# Patient Record
Sex: FEMALE | Race: WHITE | NOT HISPANIC OR LATINO | Employment: OTHER | URBAN - METROPOLITAN AREA
[De-identification: names, ages, dates, MRNs, and addresses within clinical notes are randomized per-mention and may not be internally consistent; named-entity substitution may affect disease eponyms.]

---

## 2021-04-08 ENCOUNTER — OFFICE VISIT (OUTPATIENT)
Dept: RHEUMATOLOGY | Facility: CLINIC | Age: 71
End: 2021-04-08
Payer: MEDICARE

## 2021-04-08 VITALS
DIASTOLIC BLOOD PRESSURE: 77 MMHG | BODY MASS INDEX: 23.96 KG/M2 | SYSTOLIC BLOOD PRESSURE: 126 MMHG | OXYGEN SATURATION: 98 % | HEIGHT: 67 IN | WEIGHT: 152.63 LBS | HEART RATE: 102 BPM | TEMPERATURE: 98 F | RESPIRATION RATE: 16 BRPM

## 2021-04-08 DIAGNOSIS — E03.9 HYPOTHYROIDISM, UNSPECIFIED TYPE: ICD-10-CM

## 2021-04-08 DIAGNOSIS — Z13.89 SCREENING FOR RHEUMATIC DISORDER: ICD-10-CM

## 2021-04-08 DIAGNOSIS — Z11.59 ENCOUNTER FOR HEPATITIS C SCREENING TEST FOR LOW RISK PATIENT: ICD-10-CM

## 2021-04-08 DIAGNOSIS — M19.90 OSTEOARTHRITIS, UNSPECIFIED OSTEOARTHRITIS TYPE, UNSPECIFIED SITE: ICD-10-CM

## 2021-04-08 DIAGNOSIS — Z11.1 SCREENING-PULMONARY TB: ICD-10-CM

## 2021-04-08 DIAGNOSIS — M25.50 POLYARTHRALGIA: Primary | ICD-10-CM

## 2021-04-08 PROCEDURE — 99204 OFFICE O/P NEW MOD 45 MIN: CPT | Mod: S$GLB,,, | Performed by: INTERNAL MEDICINE

## 2021-04-08 PROCEDURE — 99204 PR OFFICE/OUTPT VISIT, NEW, LEVL IV, 45-59 MIN: ICD-10-PCS | Mod: S$GLB,,, | Performed by: INTERNAL MEDICINE

## 2021-04-08 RX ORDER — CELECOXIB 200 MG/1
200 CAPSULE ORAL DAILY PRN
Qty: 30 CAPSULE | Refills: 3 | Status: SHIPPED | OUTPATIENT
Start: 2021-04-08 | End: 2021-07-21 | Stop reason: SDUPTHER

## 2021-04-08 RX ORDER — LEVOTHYROXINE SODIUM 125 UG/1
TABLET ORAL
COMMUNITY
End: 2021-09-01

## 2021-04-08 RX ORDER — CELECOXIB 200 MG/1
CAPSULE ORAL
COMMUNITY
Start: 2021-03-18 | End: 2021-04-08 | Stop reason: SDUPTHER

## 2021-05-12 ENCOUNTER — OFFICE VISIT (OUTPATIENT)
Dept: RHEUMATOLOGY | Facility: CLINIC | Age: 71
End: 2021-05-12
Payer: MEDICARE

## 2021-05-12 VITALS
SYSTOLIC BLOOD PRESSURE: 132 MMHG | WEIGHT: 146 LBS | HEIGHT: 67 IN | OXYGEN SATURATION: 99 % | HEART RATE: 90 BPM | TEMPERATURE: 98 F | DIASTOLIC BLOOD PRESSURE: 62 MMHG | BODY MASS INDEX: 22.91 KG/M2

## 2021-05-12 DIAGNOSIS — M15.8 OTHER OSTEOARTHRITIS INVOLVING MULTIPLE JOINTS: ICD-10-CM

## 2021-05-12 DIAGNOSIS — J84.9 INTERSTITIAL LUNG DISEASE: ICD-10-CM

## 2021-05-12 DIAGNOSIS — M67.912 ROTATOR CUFF DYSFUNCTION, LEFT: ICD-10-CM

## 2021-05-12 DIAGNOSIS — M05.79 RHEUMATOID ARTHRITIS INVOLVING MULTIPLE SITES WITH POSITIVE RHEUMATOID FACTOR: Primary | ICD-10-CM

## 2021-05-12 DIAGNOSIS — Z79.899 HIGH RISK MEDICATION USE: ICD-10-CM

## 2021-05-12 DIAGNOSIS — Z13.89 SCREENING FOR RHEUMATIC DISORDER: ICD-10-CM

## 2021-05-12 PROCEDURE — 99215 PR OFFICE/OUTPT VISIT, EST, LEVL V, 40-54 MIN: ICD-10-PCS | Mod: S$GLB,,, | Performed by: INTERNAL MEDICINE

## 2021-05-12 PROCEDURE — 99215 OFFICE O/P EST HI 40 MIN: CPT | Mod: S$GLB,,, | Performed by: INTERNAL MEDICINE

## 2021-05-12 RX ORDER — IPRATROPIUM BROMIDE AND ALBUTEROL SULFATE 2.5; .5 MG/3ML; MG/3ML
3 SOLUTION RESPIRATORY (INHALATION)
Status: CANCELLED | OUTPATIENT
Start: 2021-05-12

## 2021-05-12 RX ORDER — ACETAMINOPHEN 325 MG/1
650 TABLET ORAL
Status: CANCELLED | OUTPATIENT
Start: 2021-05-12

## 2021-05-12 RX ORDER — HEPARIN 100 UNIT/ML
500 SYRINGE INTRAVENOUS
Status: CANCELLED | OUTPATIENT
Start: 2021-05-12

## 2021-05-12 RX ORDER — PREDNISONE 10 MG/1
10 TABLET ORAL DAILY PRN
Qty: 30 TABLET | Refills: 1 | Status: SHIPPED | OUTPATIENT
Start: 2021-05-12 | End: 2021-07-05 | Stop reason: SDUPTHER

## 2021-05-12 RX ORDER — DIPHENHYDRAMINE HYDROCHLORIDE 50 MG/ML
25 INJECTION INTRAMUSCULAR; INTRAVENOUS
Status: CANCELLED | OUTPATIENT
Start: 2021-05-12

## 2021-05-12 RX ORDER — METHOTREXATE 2.5 MG/1
7.5 TABLET ORAL
Qty: 12 TABLET | Refills: 6 | Status: SHIPPED | OUTPATIENT
Start: 2021-05-12 | End: 2021-09-01 | Stop reason: SDUPTHER

## 2021-05-12 RX ORDER — SODIUM CHLORIDE 0.9 % (FLUSH) 0.9 %
10 SYRINGE (ML) INJECTION
Status: CANCELLED | OUTPATIENT
Start: 2021-05-12

## 2021-05-12 RX ORDER — FOLIC ACID 1 MG/1
1 TABLET ORAL DAILY
Qty: 30 TABLET | Refills: 6 | Status: SHIPPED | OUTPATIENT
Start: 2021-05-12 | End: 2021-09-01 | Stop reason: SDUPTHER

## 2021-05-12 RX ORDER — METHYLPREDNISOLONE SOD SUCC 125 MG
40 VIAL (EA) INJECTION
Status: CANCELLED | OUTPATIENT
Start: 2021-05-12

## 2021-05-12 RX ORDER — EPINEPHRINE 1 MG/ML
0.3 INJECTION, SOLUTION, CONCENTRATE INTRAVENOUS
Status: CANCELLED | OUTPATIENT
Start: 2021-05-12

## 2021-07-05 DIAGNOSIS — M05.79 RHEUMATOID ARTHRITIS INVOLVING MULTIPLE SITES WITH POSITIVE RHEUMATOID FACTOR: ICD-10-CM

## 2021-07-06 RX ORDER — PREDNISONE 10 MG/1
10 TABLET ORAL DAILY PRN
Qty: 30 TABLET | Refills: 1 | Status: SHIPPED | OUTPATIENT
Start: 2021-07-06 | End: 2021-09-01 | Stop reason: SDUPTHER

## 2021-07-07 LAB
ALBUMIN SERPL BCP-MCNC: 4.2 G/DL (ref 3.4–5)
ALBUMIN/GLOBULIN RATIO: 1.05 RATIO (ref 1.1–1.8)
ALP SERPL-CCNC: 131 U/L (ref 46–116)
ALT SERPL W P-5'-P-CCNC: 21 U/L (ref 12–78)
ANION GAP SERPL CALC-SCNC: 12 MMOL/L (ref 3–11)
APPEARANCE, UA: CLEAR
AST SERPL-CCNC: 14 U/L (ref 15–37)
BACTERIA SPEC CULT: ABNORMAL /HPF
BASOPHILS NFR BLD: 0.5 % (ref 0–3)
BILIRUB SERPL-MCNC: 0.5 MG/DL (ref 0–1)
BILIRUB UR QL STRIP: ABNORMAL MG/DL
BUN SERPL-MCNC: 14 MG/DL (ref 7–18)
BUN/CREAT SERPL: 18.18 RATIO (ref 7–18)
CALCIUM SERPL-MCNC: 9 MG/DL (ref 8.8–10.5)
CHLORIDE SERPL-SCNC: 102 MMOL/L (ref 100–108)
CO2 SERPL-SCNC: 28 MMOL/L (ref 21–32)
COLOR UR: ABNORMAL
CREAT SERPL-MCNC: 0.77 MG/DL (ref 0.55–1.02)
CREATINE KINASE, SERUM: 25 U/L (ref 26–308)
CRP QUANTITATIVE: < 0.2 MG/DL (ref 0–0.9)
EOSINOPHIL NFR BLD: 0.7 % (ref 1–3)
ERYTHROCYTE [DISTWIDTH] IN BLOOD BY AUTOMATED COUNT: 17.2 % (ref 12.5–18)
ERYTHROCYTE [SEDIMENTATION RATE] IN BLOOD: 21 MM/HR (ref 0–20)
GFR ESTIMATION: > 60
GLOBULIN: 4 G/DL (ref 2.3–3.5)
GLUCOSE (UA): NORMAL MG/DL
GLUCOSE SERPL-MCNC: 96 MG/DL (ref 70–110)
HBV SURFACE AB SER-ACNC: NONREACTIVE M[IU]/ML
HBV SURFACE AG SERPL QL IA: NONREACTIVE
HCT VFR BLD AUTO: 42.8 % (ref 37–47)
HGB BLD-MCNC: 13.6 G/DL (ref 12–16)
HGB UR QL STRIP: 10 /UL
KETONES UR QL STRIP: ABNORMAL MG/DL
LEUKOCYTE ESTERASE UR QL STRIP: 25 /UL
LYMPHOCYTES NFR BLD: 12.5 % (ref 25–40)
MCH RBC QN AUTO: 29.4 PG (ref 27–31.2)
MCHC RBC AUTO-ENTMCNC: 31.8 G/DL (ref 31.8–35.4)
MCV RBC AUTO: 92.4 FL (ref 80–97)
MONOCYTES NFR BLD: 5.1 % (ref 1–15)
NEUTROPHILS # BLD AUTO: 9.29 10*3/UL (ref 1.8–7.7)
NEUTROPHILS NFR BLD: 80.6 % (ref 37–80)
NITRITE UR QL STRIP: NEGATIVE
NUCLEATED RED BLOOD CELLS: 0 %
PH UR STRIP: 5 PH (ref 5–9)
PLATELETS: 381 10*3/UL (ref 142–424)
POTASSIUM SERPL-SCNC: 3.6 MMOL/L (ref 3.6–5.2)
PROT SERPL-MCNC: 8.2 G/DL (ref 6.4–8.2)
PROT UR QL STRIP: ABNORMAL MG/DL
RBC # BLD AUTO: 4.63 10*6/UL (ref 4.2–5.4)
RBC #/AREA URNS HPF: ABNORMAL /HPF (ref 0–2)
SERVICE COMMENT 03: ABNORMAL
SODIUM BLD-SCNC: 142 MMOL/L (ref 135–145)
SP GR UR STRIP: 1.02 (ref 1–1.03)
SPECIMEN COLLECTION METHOD, URINE: ABNORMAL
SQUAMOUS EPITHELIAL, UA: ABNORMAL /LPF
UROBILINOGEN UR STRIP-ACNC: 4 MG/DL
WBC # BLD: 11.5 10*3/UL (ref 4.6–10.2)
WBC #/AREA URNS HPF: ABNORMAL /HPF (ref 0–5)

## 2021-07-09 LAB — HEPATITIS B CORE AB TOTAL: NEGATIVE

## 2021-07-21 ENCOUNTER — OFFICE VISIT (OUTPATIENT)
Dept: RHEUMATOLOGY | Facility: CLINIC | Age: 71
End: 2021-07-21
Payer: MEDICARE

## 2021-07-21 VITALS
BODY MASS INDEX: 23.86 KG/M2 | TEMPERATURE: 98 F | HEIGHT: 67 IN | RESPIRATION RATE: 16 BRPM | OXYGEN SATURATION: 98 % | WEIGHT: 152 LBS

## 2021-07-21 DIAGNOSIS — M15.8 OTHER OSTEOARTHRITIS INVOLVING MULTIPLE JOINTS: ICD-10-CM

## 2021-07-21 DIAGNOSIS — J84.9 INTERSTITIAL LUNG DISEASE: ICD-10-CM

## 2021-07-21 DIAGNOSIS — M05.79 RHEUMATOID ARTHRITIS INVOLVING MULTIPLE SITES WITH POSITIVE RHEUMATOID FACTOR: Primary | ICD-10-CM

## 2021-07-21 DIAGNOSIS — M25.50 POLYARTHRALGIA: ICD-10-CM

## 2021-07-21 DIAGNOSIS — Z79.899 HIGH RISK MEDICATION USE: ICD-10-CM

## 2021-07-21 DIAGNOSIS — M67.912 ROTATOR CUFF DYSFUNCTION, LEFT: ICD-10-CM

## 2021-07-21 PROCEDURE — 99215 OFFICE O/P EST HI 40 MIN: CPT | Mod: S$GLB,,, | Performed by: INTERNAL MEDICINE

## 2021-07-21 PROCEDURE — 99215 PR OFFICE/OUTPT VISIT, EST, LEVL V, 40-54 MIN: ICD-10-PCS | Mod: S$GLB,,, | Performed by: INTERNAL MEDICINE

## 2021-07-21 RX ORDER — CEFDINIR 300 MG/1
300 CAPSULE ORAL 2 TIMES DAILY
COMMUNITY
End: 2021-09-01

## 2021-07-21 RX ORDER — ALBUTEROL SULFATE 5 MG/ML
2.5 SOLUTION RESPIRATORY (INHALATION) EVERY 6 HOURS PRN
COMMUNITY
End: 2022-11-21

## 2021-07-21 RX ORDER — CELECOXIB 200 MG/1
200 CAPSULE ORAL DAILY PRN
Qty: 30 CAPSULE | Refills: 3 | Status: SHIPPED | OUTPATIENT
Start: 2021-07-21 | End: 2022-01-07

## 2021-08-16 LAB
ALBUMIN SERPL BCP-MCNC: 3.6 G/DL (ref 3.4–5)
ALBUMIN/GLOBULIN RATIO: 1.09 RATIO (ref 1.1–1.8)
ALP SERPL-CCNC: 80 U/L (ref 46–116)
ALT SERPL W P-5'-P-CCNC: 22 U/L (ref 12–78)
ANION GAP SERPL CALC-SCNC: 6 MMOL/L (ref 3–11)
AST SERPL-CCNC: 17 U/L (ref 15–37)
BASOPHILS NFR BLD: 1 % (ref 0–3)
BILIRUB SERPL-MCNC: 0.5 MG/DL (ref 0–1)
BUN SERPL-MCNC: 16 MG/DL (ref 7–18)
BUN/CREAT SERPL: 22.22 RATIO (ref 7–18)
CALCIUM SERPL-MCNC: 8.8 MG/DL (ref 8.8–10.5)
CHLORIDE SERPL-SCNC: 105 MMOL/L (ref 100–108)
CO2 SERPL-SCNC: 30 MMOL/L (ref 21–32)
CREAT SERPL-MCNC: 0.72 MG/DL (ref 0.55–1.02)
CREATINE KINASE, SERUM: 24 U/L (ref 26–308)
CRP QUANTITATIVE: 0.5 MG/DL (ref 0–0.9)
EOSINOPHIL NFR BLD: 3.8 % (ref 1–3)
ERYTHROCYTE [DISTWIDTH] IN BLOOD BY AUTOMATED COUNT: 16.4 % (ref 12.5–18)
ERYTHROCYTE [SEDIMENTATION RATE] IN BLOOD: 45 MM/HR (ref 0–20)
GFR ESTIMATION: > 60
GLOBULIN: 3.3 G/DL (ref 2.3–3.5)
GLUCOSE SERPL-MCNC: 91 MG/DL (ref 70–110)
HCT VFR BLD AUTO: 39 % (ref 37–47)
HGB BLD-MCNC: 12 G/DL (ref 12–16)
HYPOCHROMIA BLD QL SMEAR: NORMAL
LYMPHOCYTES NFR BLD: 32.9 % (ref 25–40)
MCH RBC QN AUTO: 29.5 PG (ref 27–31.2)
MCHC RBC AUTO-ENTMCNC: 30.8 G/DL (ref 31.8–35.4)
MCV RBC AUTO: 95.8 FL (ref 80–97)
MONOCYTES NFR BLD: 5.8 % (ref 1–15)
NEUTROPHILS # BLD AUTO: 4.86 10*3/UL (ref 1.8–7.7)
NEUTROPHILS NFR BLD: 55.9 % (ref 37–80)
NUCLEATED RED BLOOD CELLS: 0 %
PLATELETS: 322 10*3/UL (ref 142–424)
POTASSIUM SERPL-SCNC: 4.3 MMOL/L (ref 3.6–5.2)
PROT SERPL-MCNC: 6.9 G/DL (ref 6.4–8.2)
RBC # BLD AUTO: 4.07 10*6/UL (ref 4.2–5.4)
SODIUM BLD-SCNC: 141 MMOL/L (ref 135–145)
WBC # BLD: 8.7 10*3/UL (ref 4.6–10.2)

## 2021-09-01 ENCOUNTER — OFFICE VISIT (OUTPATIENT)
Dept: RHEUMATOLOGY | Facility: CLINIC | Age: 71
End: 2021-09-01
Payer: MEDICARE

## 2021-09-01 VITALS
WEIGHT: 154 LBS | HEIGHT: 67 IN | BODY MASS INDEX: 24.17 KG/M2 | OXYGEN SATURATION: 99 % | HEART RATE: 76 BPM | RESPIRATION RATE: 16 BRPM | SYSTOLIC BLOOD PRESSURE: 138 MMHG | DIASTOLIC BLOOD PRESSURE: 73 MMHG

## 2021-09-01 DIAGNOSIS — Z79.899 HIGH RISK MEDICATION USE: ICD-10-CM

## 2021-09-01 DIAGNOSIS — J84.9 INTERSTITIAL LUNG DISEASE: ICD-10-CM

## 2021-09-01 DIAGNOSIS — M15.8 OTHER OSTEOARTHRITIS INVOLVING MULTIPLE JOINTS: ICD-10-CM

## 2021-09-01 DIAGNOSIS — M67.912 ROTATOR CUFF DYSFUNCTION, LEFT: ICD-10-CM

## 2021-09-01 DIAGNOSIS — M05.79 RHEUMATOID ARTHRITIS INVOLVING MULTIPLE SITES WITH POSITIVE RHEUMATOID FACTOR: Primary | ICD-10-CM

## 2021-09-01 PROCEDURE — 99215 OFFICE O/P EST HI 40 MIN: CPT | Mod: S$GLB,,, | Performed by: INTERNAL MEDICINE

## 2021-09-01 PROCEDURE — 99215 PR OFFICE/OUTPT VISIT, EST, LEVL V, 40-54 MIN: ICD-10-PCS | Mod: S$GLB,,, | Performed by: INTERNAL MEDICINE

## 2021-09-01 RX ORDER — PREDNISONE 10 MG/1
10 TABLET ORAL DAILY PRN
Qty: 30 TABLET | Refills: 2 | Status: SHIPPED | OUTPATIENT
Start: 2021-09-01 | End: 2021-12-21 | Stop reason: SDUPTHER

## 2021-09-01 RX ORDER — FOLIC ACID 1 MG/1
1 TABLET ORAL DAILY
Qty: 30 TABLET | Refills: 6 | Status: SHIPPED | OUTPATIENT
Start: 2021-09-01 | End: 2022-01-07 | Stop reason: SDUPTHER

## 2021-09-01 RX ORDER — METHOTREXATE 2.5 MG/1
15 TABLET ORAL
Qty: 24 TABLET | Refills: 4 | Status: SHIPPED | OUTPATIENT
Start: 2021-09-01 | End: 2021-09-13 | Stop reason: SDUPTHER

## 2021-09-01 RX ORDER — LEVOTHYROXINE SODIUM 75 UG/1
75 TABLET ORAL DAILY
COMMUNITY
Start: 2021-08-24 | End: 2022-11-21

## 2021-12-13 LAB
ALBUMIN SERPL BCP-MCNC: 3.9 G/DL (ref 3.4–5)
ALBUMIN/GLOBULIN RATIO: 1.44 RATIO (ref 1.1–1.8)
ALP SERPL-CCNC: 76 U/L (ref 46–116)
ALT SERPL W P-5'-P-CCNC: 35 U/L (ref 12–78)
ANION GAP SERPL CALC-SCNC: 8 MMOL/L (ref 3–11)
AST SERPL-CCNC: 26 U/L (ref 15–37)
BASOPHILS NFR BLD: 0.7 % (ref 0–3)
BILIRUB SERPL-MCNC: 0.6 MG/DL (ref 0–1)
BUN SERPL-MCNC: 13 MG/DL (ref 7–18)
BUN/CREAT SERPL: 17.8 RATIO (ref 7–18)
CALCIUM SERPL-MCNC: 9 MG/DL (ref 8.8–10.5)
CHLORIDE SERPL-SCNC: 102 MMOL/L (ref 100–108)
CO2 SERPL-SCNC: 29 MMOL/L (ref 21–32)
CREAT SERPL-MCNC: 0.73 MG/DL (ref 0.55–1.02)
CREATINE KINASE, SERUM: 35 U/L (ref 26–308)
CRP QUANTITATIVE: < 0.2 MG/DL (ref 0–0.9)
EOSINOPHIL NFR BLD: 2 % (ref 1–3)
ERYTHROCYTE [DISTWIDTH] IN BLOOD BY AUTOMATED COUNT: 14.9 % (ref 12.5–18)
ERYTHROCYTE [SEDIMENTATION RATE] IN BLOOD: 20 MM/HR (ref 0–20)
GFR ESTIMATION: > 60
GLOBULIN: 2.7 G/DL (ref 2.3–3.5)
GLUCOSE SERPL-MCNC: 98 MG/DL (ref 70–110)
HCT VFR BLD AUTO: 39.7 % (ref 37–47)
HGB BLD-MCNC: 12.3 G/DL (ref 12–16)
LYMPHOCYTES NFR BLD: 23 % (ref 25–40)
MACROCYTES BLD QL SMEAR: NORMAL
MCH RBC QN AUTO: 32.2 PG (ref 27–31.2)
MCHC RBC AUTO-ENTMCNC: 31 G/DL (ref 31.8–35.4)
MCV RBC AUTO: 103.9 FL (ref 80–97)
MONOCYTES NFR BLD: 4.3 % (ref 1–15)
NEUTROPHILS # BLD AUTO: 5.95 10*3/UL (ref 1.8–7.7)
NEUTROPHILS NFR BLD: 69.4 % (ref 37–80)
NUCLEATED RED BLOOD CELLS: 0 %
PLATELETS: 299 10*3/UL (ref 142–424)
POTASSIUM SERPL-SCNC: 4.4 MMOL/L (ref 3.6–5.2)
PROT SERPL-MCNC: 6.6 G/DL (ref 6.4–8.2)
RBC # BLD AUTO: 3.82 10*6/UL (ref 4.2–5.4)
SODIUM BLD-SCNC: 139 MMOL/L (ref 135–145)
WBC # BLD: 8.6 10*3/UL (ref 4.6–10.2)

## 2021-12-29 NOTE — PROGRESS NOTES
Subjective:      Patient ID: Alysa Abernathy is a 71 y.o. female.    Chief Complaint: Disease Management    HPI:   Includes joint pain with subjective swelling.   Antecedent event includes: None;  Pain location includes: hands;  Gradual onset; beginning >years ago;  Intermittent ache, Mild in severity,   Lasting >minutes, Worse during the daytime;   Improved with rest, medication, change in position and none;   Worsened with activity and overuse;     Review of Systems   Constitutional: Negative for chills, fatigue, fever and unexpected weight change.   HENT: Negative for nasal congestion, ear pain, hearing loss, mouth dryness, mouth sores, nosebleeds and trouble swallowing.    Eyes: Positive for visual disturbance and eye dryness. Negative for photophobia.   Respiratory: Negative for cough and shortness of breath.    Cardiovascular: Negative for chest pain, palpitations and leg swelling.   Gastrointestinal: Negative for abdominal distention, abdominal pain, blood in stool, constipation, diarrhea, rectal pain, vomiting and fecal incontinence.   Endocrine: Negative for polydipsia and polyuria.   Genitourinary: Negative for bladder incontinence, dysuria, enuresis, genital sores and hematuria.   Musculoskeletal: Positive for arthralgias. Negative for joint swelling and myalgias.   Integumentary:  Negative for rash, wound and mole/lesion.   Neurological: Positive for headaches. Negative for weakness.   Hematological: Negative for adenopathy. Bruises/bleeds easily.   Psychiatric/Behavioral: Negative for dysphoric mood and sleep disturbance. The patient is not nervous/anxious.       Past Medical History:   Diagnosis Date    Arthritis     Cataract     Dry mouth     Thyroid disease      Past Surgical History:   Procedure Laterality Date    EYE SURGERY      HYSTERECTOMY      TONSILLECTOMY        See the Assessment/Plan for further characterization of the HPI, ROS, Medical, Surgical, Family, and Social Histories  "including Tobacco use, Meds; all of which has been reviewed in Epic.    Medication List with Changes/Refills   New Medications    CALCIUM CARBONATE-VITAMIN D3 (CALCIUM 500 + D) 500 MG-10 MCG (400 UNIT) TAB    Take 1 tablet by mouth once daily.   Current Medications    ALBUTEROL (PROVENTIL) 5 MG/ML NEBULIZER SOLUTION    Take 2.5 mg by nebulization every 6 (six) hours as needed for Wheezing. Rescue    LEVOTHYROXINE (SYNTHROID) 75 MCG TABLET    Take 75 mcg by mouth once daily.    PREDNISONE (DELTASONE) 10 MG TABLET    Take 1 tablet (10 mg total) by mouth daily as needed (pain with swelling).   Changed and/or Refilled Medications    Modified Medication Previous Medication    FOLIC ACID (FOLVITE) 1 MG TABLET folic acid (FOLVITE) 1 MG tablet       Take 1 tablet (1 mg total) by mouth once daily.    Take 1 tablet (1 mg total) by mouth once daily.    METHOTREXATE 2.5 MG TAB methotrexate 2.5 MG Tab       Take 8 tablets (20 mg total) by mouth every 7 days.    Take 6 tablets (15 mg total) by mouth every 7 days.   Discontinued Medications    CELECOXIB (CELEBREX) 200 MG CAPSULE    Take 1 capsule (200 mg total) by mouth daily as needed for Pain.         Objective:   /84   Pulse 84   Resp 16   Ht 5' 7" (1.702 m)   Wt 74.4 kg (164 lb)   SpO2 97%   BMI 25.69 kg/m²   Physical Exam  Non-toxic appearance. No distress.   Normocephalic and atraumatic.   External ears normal.    Conjunctivae and EOM are normal. No scleral icterus.   RRR, No friction rub; palpable distal pulses.    No tachypnea or signs of respiratory distress.   Abdominal: No guarding or rebound tenderness.   Neurological: Oriented. Normal thought content. No cranial nerve deficit. Strength is grossly normal without focal deficit.  Skin is warm. No pallor.   Musculoskeletal: see below for further input. No overt synovitis.     No image results found.    Orders Only on 12/13/2021   Component Date Value Ref Range Status    Sodium 12/13/2021 139  135 - 145 mmol/L " Final    Potassium 12/13/2021 4.4  3.6 - 5.2 mmol/L Final    Chloride 12/13/2021 102  100 - 108 mmol/L Final    CO2 12/13/2021 29  21 - 32 mmol/L Final    Anion Gap 12/13/2021 8.0  3.0 - 11.0 mmol/L Final    BUN 12/13/2021 13  7 - 18 mg/dL Final    Creatinine 12/13/2021 0.73  0.55 - 1.02 mg/dL Final    GFR ESTIMATION 12/13/2021 > 60  >60 Final               DESCRIPTION       GLOMERULAR FILTRATION RATE             NORMAL                     >60             KIDNEY  DISEASE           15-60             KIDNEY FAILURE             <15    BUN/Creatinine Ratio 12/13/2021 17.80  7 - 18 Ratio Final    Glucose 12/13/2021 98  70 - 110 mg/dL Final    Calcium 12/13/2021 9.0  8.8 - 10.5 mg/dL Final    Total Bilirubin 12/13/2021 0.6  0.0 - 1.0 mg/dL Final    AST 12/13/2021 26  15 - 37 U/L Final    ALT 12/13/2021 35  12 - 78 U/L Final    Total Protein 12/13/2021 6.6  6.4 - 8.2 g/dL Final    Albumin 12/13/2021 3.9  3.4 - 5.0 g/dL Final    Globulin 12/13/2021 2.7  2.3 - 3.5 g/dL Final    Albumin/Globulin Ratio 12/13/2021 1.444  1.1 - 1.8 Ratio Final    Alkaline Phosphatase 12/13/2021 76  46 - 116 U/L Final    CK, Serum 12/13/2021 35  26 - 308 U/L Final    CRP QUANTITATIVE 12/13/2021 < 0.2  0.0 - 0.9 mg/dL Final   Orders Only on 12/13/2021   Component Date Value Ref Range Status    WBC 12/13/2021 8.6  4.6 - 10.2 10*3/uL Final    RBC 12/13/2021 3.82* 4.2 - 5.4 10*6/uL Final    Hemoglobin 12/13/2021 12.3  12.0 - 16.0 g/dL Final    Hematocrit 12/13/2021 39.7  37.0 - 47.0 % Final    MCV 12/13/2021 103.9* 80 - 97 fL Final    MCH 12/13/2021 32.2* 27.0 - 31.2 pg Final    MCHC 12/13/2021 31.0* 31.8 - 35.4 g/dL Final    RDW RBC Auto-Rto 12/13/2021 14.9  12.5 - 18.0 % Final    Platelets 12/13/2021 299  142 - 424 10*3/uL Final    Neutrophils 12/13/2021 69.4  37 - 80 % Final    Lymphocytes 12/13/2021 23.0* 25 - 40 % Final    Monocytes 12/13/2021 4.3  1 - 15 % Final    Eosinophils 12/13/2021 2.0  1 - 3 % Final     Basophils 12/13/2021 0.7  0 - 3 % Final    nRBC# 12/13/2021 0.0  % Final    Neutrophils Absolute 12/13/2021 5.95  1.8 - 7.7 10*3/uL Final    Macrocytes 12/13/2021 1+   Final    Sed Rate 12/13/2021 20  0 - 20 mm/hr Final   Orders Only on 08/16/2021   Component Date Value Ref Range Status    Hypochromia 08/16/2021 1+   Final    WBC 08/16/2021 8.7  4.6 - 10.2 10*3/uL Final    RBC 08/16/2021 4.07* 4.2 - 5.4 10*6/uL Final    Hemoglobin 08/16/2021 12.0  12.0 - 16.0 g/dL Final    Hematocrit 08/16/2021 39.0  37.0 - 47.0 % Final    MCV 08/16/2021 95.8  80 - 97 fL Final    MCH 08/16/2021 29.5  27.0 - 31.2 pg Final    MCHC 08/16/2021 30.8* 31.8 - 35.4 g/dL Final    RDW RBC Auto-Rto 08/16/2021 16.4  12.5 - 18.0 % Final    Platelets 08/16/2021 322  142 - 424 10*3/uL Final    Neutrophils 08/16/2021 55.9  37 - 80 % Final    Lymphocytes 08/16/2021 32.9  25 - 40 % Final    Monocytes 08/16/2021 5.8  1 - 15 % Final    Eosinophils 08/16/2021 3.8* 1 - 3 % Final    Basophils 08/16/2021 1.0  0 - 3 % Final    nRBC# 08/16/2021 0.0  % Final    Neutrophils Absolute 08/16/2021 4.86  1.8 - 7.7 10*3/uL Final    Sed Rate 08/16/2021 45* 0 - 20 mm/hr Final   Orders Only on 08/16/2021   Component Date Value Ref Range Status    CRP QUANTITATIVE 08/16/2021 0.5  0.0 - 0.9 mg/dL Final    Sodium 08/16/2021 141  135 - 145 mmol/L Final    Potassium 08/16/2021 4.3  3.6 - 5.2 mmol/L Final    Chloride 08/16/2021 105  100 - 108 mmol/L Final    CO2 08/16/2021 30  21 - 32 mmol/L Final    Anion Gap 08/16/2021 6.0  3.0 - 11.0 mmol/L Final    BUN 08/16/2021 16  7 - 18 mg/dL Final    Creatinine 08/16/2021 0.72  0.55 - 1.02 mg/dL Final    GFR ESTIMATION 08/16/2021 > 60  >60 Final               DESCRIPTION       GLOMERULAR FILTRATION RATE             NORMAL                     >60             KIDNEY  DISEASE           15-60             KIDNEY FAILURE             <15    BUN/Creatinine Ratio 08/16/2021 22.22* 7 - 18 Ratio Final     Glucose 08/16/2021 91  70 - 110 mg/dL Final    Calcium 08/16/2021 8.8  8.8 - 10.5 mg/dL Final    Total Bilirubin 08/16/2021 0.5  0.0 - 1.0 mg/dL Final    AST 08/16/2021 17  15 - 37 U/L Final    ALT 08/16/2021 22  12 - 78 U/L Final    Total Protein 08/16/2021 6.9  6.4 - 8.2 g/dL Final    Albumin 08/16/2021 3.6  3.4 - 5.0 g/dL Final    Globulin 08/16/2021 3.3  2.3 - 3.5 g/dL Final    Albumin/Globulin Ratio 08/16/2021 1.090* 1.1 - 1.8 Ratio Final    Alkaline Phosphatase 08/16/2021 80  46 - 116 U/L Final    CK, Serum 08/16/2021 24* 26 - 308 U/L Final   Orders Only on 07/07/2021   Component Date Value Ref Range Status    Hepatitis B Core Ab Total 07/07/2021 NEGATIVE  Negative Final    Comment: INTERPRETIVE INFORMATION: Hepatitis B Core Ab (Total)This assay should not be used for blood donor screening,associated re-entry protocols, or for screening Human Cells,Tissues and Cellular and Tissue-Based Products (HCT/P).Performed By: [facility]   [id number] [place] [id number] Director: Priscilla Crystal MD, MS     Orders Only on 07/07/2021   Component Date Value Ref Range Status    Hep B S Ab 07/07/2021 Nonreactive  Nonreactive Final    Hepatitis B Surface Ag 07/07/2021 Nonreactive  Nonreactive Final   Orders Only on 07/07/2021   Component Date Value Ref Range Status    WBC 07/07/2021 11.5* 4.6 - 10.2 10*3/uL Final    RBC 07/07/2021 4.63  4.2 - 5.4 10*6/uL Final    Hemoglobin 07/07/2021 13.6  12.0 - 16.0 g/dL Final    Hematocrit 07/07/2021 42.8  37.0 - 47.0 % Final    MCV 07/07/2021 92.4  80 - 97 fL Final    MCH 07/07/2021 29.4  27.0 - 31.2 pg Final    MCHC 07/07/2021 31.8  31.8 - 35.4 g/dL Final    RDW RBC Auto-Rto 07/07/2021 17.2  12.5 - 18.0 % Final    Platelets 07/07/2021 381  142 - 424 10*3/uL Final    Neutrophils 07/07/2021 80.6* 37 - 80 % Final    Lymphocytes 07/07/2021 12.5* 25 - 40 % Final    Monocytes 07/07/2021 5.1  1 - 15 % Final    Eosinophils 07/07/2021 0.7* 1 - 3  % Final    Basophils 07/07/2021 0.5  0 - 3 % Final    nRBC# 07/07/2021 0.0  % Final    Neutrophils Absolute 07/07/2021 9.29* 1.8 - 7.7 10*3/uL Final    Sed Rate 07/07/2021 21* 0 - 20 mm/hr Final   Orders Only on 07/07/2021   Component Date Value Ref Range Status    CRP QUANTITATIVE 07/07/2021 < 0.2  0.0 - 0.9 mg/dL Final    Sodium 07/07/2021 142  135 - 145 mmol/L Final    Potassium 07/07/2021 3.6  3.6 - 5.2 mmol/L Final    Chloride 07/07/2021 102  100 - 108 mmol/L Final    CO2 07/07/2021 28  21 - 32 mmol/L Final    Anion Gap 07/07/2021 12.0* 3.0 - 11.0 mmol/L Final    BUN 07/07/2021 14  7 - 18 mg/dL Final    Creatinine 07/07/2021 0.77  0.55 - 1.02 mg/dL Final    GFR ESTIMATION 07/07/2021 > 60  >60 Final               DESCRIPTION       GLOMERULAR FILTRATION RATE             NORMAL                     >60             KIDNEY  DISEASE           15-60             KIDNEY FAILURE             <15    BUN/Creatinine Ratio 07/07/2021 18.18* 7 - 18 Ratio Final    Glucose 07/07/2021 96  70 - 110 mg/dL Final    Calcium 07/07/2021 9.0  8.8 - 10.5 mg/dL Final    Total Bilirubin 07/07/2021 0.5  0.0 - 1.0 mg/dL Final    AST 07/07/2021 14* 15 - 37 U/L Final    ALT 07/07/2021 21  12 - 78 U/L Final    Total Protein 07/07/2021 8.2  6.4 - 8.2 g/dL Final    Albumin 07/07/2021 4.2  3.4 - 5.0 g/dL Final    Globulin 07/07/2021 4.0* 2.3 - 3.5 g/dL Final    Albumin/Globulin Ratio 07/07/2021 1.050* 1.1 - 1.8 Ratio Final    Alkaline Phosphatase 07/07/2021 131* 46 - 116 U/L Final    CK, Serum 07/07/2021 25* 26 - 308 U/L Final   Orders Only on 07/07/2021   Component Date Value Ref Range Status    Specimen Collection Method 07/07/2021 Void   Final    Color, UA 07/07/2021 Brown* Yellow Final    Appearance, UA 07/07/2021 Clear  Clear Final    pH, UA 07/07/2021 5.0  5.0 - 9.0 pH Final    Specific Gravity, UA 07/07/2021 1.025  1.000 - 1.030 Final    Protein, UA 07/07/2021 Trace* Negative mg/dL Final    Glucose, UA  07/07/2021 Normal  Normal mg/dL Final    Ketones, UA 07/07/2021 Trace* Negative mg/dL Final    Occult Blood UA 07/07/2021 10* Negative /uL Final    Nitrite, UA 07/07/2021 Negative  Negative Final    Bilirubin (UA) 07/07/2021 Small* Negative mg/dL Final    Urobilinogen, UA 07/07/2021 4.0* Normal mg/dL Final    Leukocytes, UA 07/07/2021 25* Negative /uL Final    RBC, UA 07/07/2021 0-2  0 - 2 /HPF Final    WBC, UA 07/07/2021 0-2  0 - 5 /HPF Final    Squam Epithel, UA 07/07/2021 1+ Few  /LPF Final    Bacteria 07/07/2021 +/- Rare  Few/HPF /HPF Final    Service Comment 03 07/07/2021 No, Criteria Not Met   Final        All of the data above and below has been reviewed by myself and any further interpretations will be reflected in the assessment and plan.   The data includes review of external notes, and independent interpretation of lab results, x-rays, and imaging reports.  Active inflammatory arthritis is an illness that poses a threat to bodily function and even a threat to life in some cases.  Drug therapy to treat inflammatory arthritis usually requires intensive monitoring for toxicity.    Review of patient's allergies indicates:  No Known Allergies  Assessment/Plan:     Visit prior to Visit prior to last visit:    lab 4/14/21:  SPEP alpha 2 slight high  Serum MARIANNA normal  Ig level normal   HLAB27 neg  TB neg  PRUDENCE neg  +  C3 161  C4 31  Creat 0.6; alb 4.2;  CCP not done  TSH 0.2 low T4 2  CPK 29  WBC 8.2; Hgb 12.3 MCV 94; plt 302  ESR 68; CRP 2.715    records    4/26/21: Rheumatoid arthritis, Dupuytrens, trigger fingers: tendon sheath and finger injections   1/16/20, 3/20/20; 5/21/20,6/30/20, 9/29/20, 12/8/20; 2/23/21: each visit injections in hand joints and or wrist elbow, dequervains etc.    12/18/20: CT chest with: mild ILD predominantly subpleural lower lungs, small hiatal hernia  7/25/18 MRI left shoulder: partial tears supra/infraspinatus tendons; moderate tendinosis long head biceps  tendon; glenohumeral and AC DJD; changes suggestive adhesive capsulitis    Moderate to severe disease activity  Mild to moderate deformities  Mild to Moderately impaired functional status  Fair prognosis    She failed Methotrexate per history but also stopped and felt not helping    Infliximab 3mg/kg 0,2,6 then q 8wks, hold if infection    Resume MTX 7.5mg PO weekly  Folic acid 1mg daily    Resume prednisone 10mg qam prn  We will retry to seek records Dr. Lino but sounds like only one tele visit)    Visit prior to Last visit:    ESR 21; CRP normal    Prior plan for   Infliximab 3mg/kg 0,2,6 then q 8wks, hold if infection but  She rather never got the infusion and didn't think to call us    MTX 7.5mg PO weekly  Folic acid 1mg daily  Prior plan Resume prednisone 10mg qam prn    No longer using celebrex 200mg daily prn as she ran out and we will refill    She is currently on antibiotic from PCP for a cough and should hold the Methotrexate until symptoms resolve    No overt synovitis but she is on prednisone 10mg and the MTX  No overt pneumonia on exam but does have some diminished breath sounds and crackles in base;   her lung exam is unchanged from previous    She has been reluctant to see Pulmonary for the Interstitial Lung disease   as she wanted to work on her rheumatoid arthritis first and   then see Pulmonary which   I explained she should rather do both that will optimize her outcome;   As she needs regular monitoring CT chest PFT's exam etc;    Otherwise it may be confusing if she has symptoms of infection and will potentially put a hold on her treatment for the rheumatoid.    We will seek records from PCP and they will try to keep us in the loop moving forward  We will send today's note to PCP     Dara will look into getting the infusion set up; the orders were written and sent; maybe they had trouble getting in touch with her or other?    Infliximab 3mg/kg 0,2,6 then q 8wks, hold if infection    Hold  Methotrexate for now until symptoms of infection resolves    Resume Celebrex 200mg daily prn    Contact PCP today to see about seeing/getting reestablished with Pulmonary for monitoring;  She should also see Dermatology for regular skin exam given the [facility] sun damage scar including forearms    She will plan to contact us for any problem or question including running out of meds or not getting     Last visit:    ESR 45 CRP<0.3    Infliximab 3mg/kg 0,2,6 then q 8wks, hold if infection    Prior plan Hold Methotrexate for now until symptoms of infection resolves  MTX 7.5mg PO weekly  Folic acid 1mg daily    Celebrex 200mg daily prn    She has been doing much better    She had 2 of the infusions    Some pain weakness in wrists and ankles    She is still using prednisone 10mg qam and she is some leery of decreasing as she is doing so well    She hurt her low back lifting something and went to urgent care and has improved    She uses ibuprofen instead of celebrex but still has celebrex and can use either but not both at same time    No overt synovitis  Appears mostly in remission with mild to moderate deformity  Lungs sound better     Cont inflextra infusions    Increase Methotrexate 15mg PO weekly    Cont folic acid 1mg daily    Cont prednisone 10mg qam for 2 weeks then attempt to decrease to 5mg as tolerated per response;   If doing well on 5mg for weeks more she can attempt to decrease to every other day and/or stop altogether  If problem decreasing can resume prior dose and call us    Revisit 4 months with lab 2 weeks prior    Contact us prn    This visit:    interim lab:  Lab Results   Component Value Date     12/13/2021    K 4.4 12/13/2021     12/13/2021    CO2 29 12/13/2021    ANIONGAP 8.0 12/13/2021    GLU 98 12/13/2021    CALCIUM 9.0 12/13/2021    BUN 13 12/13/2021    CREATININE 0.73 12/13/2021    PROT 6.6 12/13/2021    AST 26 12/13/2021    ALT 35 12/13/2021    BILITOT 0.6 12/13/2021    ALKPHOS 76  12/13/2021    WBC 8.6 12/13/2021    NEUTROPHILS 69.4 12/13/2021    HGB 12.3 12/13/2021    .9 (H) 12/13/2021    LABPLAT 299 12/13/2021    SEDRATE 20 12/13/2021    WBCUA 0-2 07/07/2021    RBCUA 0-2 07/07/2021    BACTERIA +/- Rare 07/07/2021    PROTEINUA Trace (A) 07/07/2021         ESR 20 CRP<0.2    Infliximab 3mg/kg 0,2,6 then q 8wks, hold if infection    Methotrexate 15mg PO weekly    Cont folic acid 1mg daily    Celebrex 200mg daily prn    Cont prednisone 10mg qam for 2 weeks then attempt to decrease to 5mg as tolerated per response;   If doing well on 5mg for weeks more she can attempt to decrease to every other day and/or stop altogether  If problem decreasing can resume prior dose and call us    She has some interim swelling in hands but she has been doing much better    Some interim vision problems and we will refer to Ophthalmology for further evaluation and also for plaquenil exam    She stopped celebrex as occasional ibuprofen is adequate but I think she is still using intermittent prednisone and I would like to if she can wean that first    She has been back to using prednisone 10mg which I went over tapering plan in future; down to 5mg for weeks and if doing well 5mg qod few weeks and if doing well stop; if not doing well go back to previous dose and contact us    She is more interested in seeing Pulmonary and we will refer and also get PFTs spirometry    No overt synovitis  Appears to be in remission but still needing prednisone  Lungs some crackles left base and some diminished breath sounds but her lung exam sounds better    Increase Methotrexate 20mg PO weekly  folic acid 1mg daily    Infliximab 3mg/kg 0,2,6 then q 8wks, hold if infection    Prednisone as per above    DEXA to further evaluate    Add Calcium D daily    we will refer to Ophthalmology for further evaluation of vision problems and also for plaquenil exam    We will refer to Pulmonary    PFTs spirometry    Revisit 3 months with lab 2  "weeks prior    Contact us prn    Addendum 2/22/22: I received order to rewrite orders for her infusion today and I am going to increase the dose to 4mg/kg with the goal of getting her off prednisone in the future; We can always consider decreasing the dose in the future if doing well     + CCP 41.08+ PRUDENCE neg HLAB27 neg uric acid 5.2 HCV neg HepBsAg negsAb neg coreAb neg  Rheumatoid arthritis    initial visit noted:  (Pain and swelling including hands and feet  Initially working with Dr. Alvarez getting "11 injections" including finger joints wrist;   also was injected for trigger fingers  She had a lot of relief from the injections including in other painful areas of her body  Most recent injections were 3 weeks ago  She notes that her joint swelling and stiffness is not as bad today; attributes to steroids  Also getting oral prednisone and medrol going back to 5/21/19 per records from PCP Dr. Linn; also CCP41+ 5/22/20  No overt synovitis; DJD generalized including hands)    12/18/20: CT chest with: mild ILD predominantly subpleural lower lungs, small hiatal hernia  7/25/18 MRI left shoulder: partial tears spinatus tendons; moderate tendinosis long head biceps tendon; glenohumeral and AC DJD; changes suggestive adhesive capsulitis    Prev noted:(She has been reluctant to see Pulmonary for the Interstitial Lung disease   as she wanted to work on her rheumatoid arthritis first and   then see Pulmonary which   I explained she should rather do both that will optimize her outcome;   As she needs regular monitoring CT chest PFT's exam etc;    Otherwise it may be confusing if she has symptoms of infection and will potentially put a hold on her treatment for the rheumatoid.)    2021 TB negative    Prior left shoulder injection    Had seen Dr. Lino and given MTX for 3 months that "did not help at all"  Has also been working  records    4/26/21: Rheumatoid arthritis, Dupuytrens, trigger fingers: tendon sheath and " finger injections   1/16/20, 3/20/20; 5/21/20,6/30/20, 9/29/20, 12/8/20; 2/23/21: each visit injections in hand joints and or wrist elbow, dequervains etc.    Has been managing with    Prior mobic    Prior oral prednisone  Steroid injections as per records  We refilled celebrex 200mg daily prn prev noted     Prev and again noted: (Rosacea on face with some Moon facies  [facility] sun damage scar including forearms   she should have dermatology evaluation at her earliest convenience)     Review of medical records as stated above.  Lab +/- imaging and other ordered diagnostic studies to further evaluate.  See the orders associated with this note visit.  Medications as prescribed as tolerated.    Education including verbal and those noted in the patient instructions.  Revisit as scheduled and call prn.    The following diagnoses influence medical decision making and/or need further workup including the orders listed below:    Rheumatoid arthritis involving multiple sites with positive rheumatoid factor  -     CBC Auto Differential; Future; Expected date: 03/21/2022  -     CK; Future; Expected date: 03/21/2022  -     Comprehensive Metabolic Panel; Future; Expected date: 03/21/2022  -     C-Reactive Protein; Future; Expected date: 03/21/2022  -     Sedimentation rate; Future; Expected date: 03/21/2022  -     Urinalysis; Future; Expected date: 03/21/2022  -     methotrexate 2.5 MG Tab; Take 8 tablets (20 mg total) by mouth every 7 days.  Dispense: 32 tablet; Refill: 4  -     folic acid (FOLVITE) 1 MG tablet; Take 1 tablet (1 mg total) by mouth once daily.  Dispense: 30 tablet; Refill: 6  -     Ambulatory referral/consult to Ophthalmology; Future; Expected date: 01/14/2022    Interstitial lung disease  -     CBC Auto Differential; Future; Expected date: 03/21/2022  -     CK; Future; Expected date: 03/21/2022  -     Comprehensive Metabolic Panel; Future; Expected date: 03/21/2022  -     C-Reactive Protein; Future; Expected  date: 03/21/2022  -     Sedimentation rate; Future; Expected date: 03/21/2022  -     Urinalysis; Future; Expected date: 03/21/2022  -     Ambulatory referral/consult to Pulmonology; Future; Expected date: 01/14/2022  -     Complete PFT with bronchodilator; Future    Other osteoarthritis involving multiple joints  -     CBC Auto Differential; Future; Expected date: 03/21/2022  -     CK; Future; Expected date: 03/21/2022  -     Comprehensive Metabolic Panel; Future; Expected date: 03/21/2022  -     C-Reactive Protein; Future; Expected date: 03/21/2022  -     Sedimentation rate; Future; Expected date: 03/21/2022  -     Urinalysis; Future; Expected date: 03/21/2022    Shortness of breath  -     Ambulatory referral/consult to Pulmonology; Future; Expected date: 01/14/2022  -     Complete PFT with bronchodilator; Future    High risk medication use  -     CBC Auto Differential; Future; Expected date: 03/21/2022  -     CK; Future; Expected date: 03/21/2022  -     Comprehensive Metabolic Panel; Future; Expected date: 03/21/2022  -     C-Reactive Protein; Future; Expected date: 03/21/2022  -     Sedimentation rate; Future; Expected date: 03/21/2022  -     Urinalysis; Future; Expected date: 03/21/2022  -     methotrexate 2.5 MG Tab; Take 8 tablets (20 mg total) by mouth every 7 days.  Dispense: 32 tablet; Refill: 4  -     folic acid (FOLVITE) 1 MG tablet; Take 1 tablet (1 mg total) by mouth once daily.  Dispense: 30 tablet; Refill: 6    Rotator cuff dysfunction, left  -     CBC Auto Differential; Future; Expected date: 03/21/2022  -     CK; Future; Expected date: 03/21/2022  -     Comprehensive Metabolic Panel; Future; Expected date: 03/21/2022  -     C-Reactive Protein; Future; Expected date: 03/21/2022  -     Sedimentation rate; Future; Expected date: 03/21/2022  -     Urinalysis; Future; Expected date: 03/21/2022    Screening for osteoporosis  -     DXA Bone Density Spine And Hip; Future; Expected date: 01/07/2022    Long  term current use of systemic steroids  -     DXA Bone Density Spine And Hip; Future; Expected date: 01/07/2022  -     Ambulatory referral/consult to Ophthalmology; Future; Expected date: 01/14/2022  -     calcium carbonate-vitamin D3 (CALCIUM 500 + D) 500 mg-10 mcg (400 unit) Tab; Take 1 tablet by mouth once daily.  Dispense: 30 each; Refill: 6    Blurry vision, bilateral  -     Ambulatory referral/consult to Ophthalmology; Future; Expected date: 01/14/2022      Follow up in about 3 months (around 4/7/2022).     Leonard Shelton MD

## 2022-01-07 ENCOUNTER — CLINICAL SUPPORT (OUTPATIENT)
Dept: PULMONOLOGY | Facility: CLINIC | Age: 72
End: 2022-01-07
Payer: MEDICARE

## 2022-01-07 ENCOUNTER — OFFICE VISIT (OUTPATIENT)
Dept: RHEUMATOLOGY | Facility: CLINIC | Age: 72
End: 2022-01-07
Payer: MEDICARE

## 2022-01-07 VITALS
RESPIRATION RATE: 16 BRPM | DIASTOLIC BLOOD PRESSURE: 84 MMHG | SYSTOLIC BLOOD PRESSURE: 127 MMHG | HEIGHT: 67 IN | OXYGEN SATURATION: 97 % | WEIGHT: 164 LBS | HEART RATE: 84 BPM | BODY MASS INDEX: 25.74 KG/M2

## 2022-01-07 DIAGNOSIS — Z79.52 LONG TERM CURRENT USE OF SYSTEMIC STEROIDS: ICD-10-CM

## 2022-01-07 DIAGNOSIS — M05.79 RHEUMATOID ARTHRITIS INVOLVING MULTIPLE SITES WITH POSITIVE RHEUMATOID FACTOR: Primary | ICD-10-CM

## 2022-01-07 DIAGNOSIS — J84.9 INTERSTITIAL LUNG DISEASE: ICD-10-CM

## 2022-01-07 DIAGNOSIS — R06.02 SHORTNESS OF BREATH: ICD-10-CM

## 2022-01-07 DIAGNOSIS — M15.8 OTHER OSTEOARTHRITIS INVOLVING MULTIPLE JOINTS: ICD-10-CM

## 2022-01-07 DIAGNOSIS — H53.8 BLURRY VISION, BILATERAL: ICD-10-CM

## 2022-01-07 DIAGNOSIS — Z79.899 HIGH RISK MEDICATION USE: ICD-10-CM

## 2022-01-07 DIAGNOSIS — M67.912 ROTATOR CUFF DYSFUNCTION, LEFT: ICD-10-CM

## 2022-01-07 DIAGNOSIS — Z13.820 SCREENING FOR OSTEOPOROSIS: ICD-10-CM

## 2022-01-07 DIAGNOSIS — R06.02 SOB (SHORTNESS OF BREATH): Primary | ICD-10-CM

## 2022-01-07 PROCEDURE — 94010 BREATHING CAPACITY TEST: ICD-10-PCS | Mod: S$GLB,,, | Performed by: INTERNAL MEDICINE

## 2022-01-07 PROCEDURE — 94726 PLETHYSMOGRAPHY LUNG VOLUMES: CPT | Mod: S$GLB,,, | Performed by: INTERNAL MEDICINE

## 2022-01-07 PROCEDURE — 99215 PR OFFICE/OUTPT VISIT, EST, LEVL V, 40-54 MIN: ICD-10-PCS | Mod: S$GLB,,, | Performed by: INTERNAL MEDICINE

## 2022-01-07 PROCEDURE — 99215 OFFICE O/P EST HI 40 MIN: CPT | Mod: S$GLB,,, | Performed by: INTERNAL MEDICINE

## 2022-01-07 PROCEDURE — 94726 PULM FUNCT TST PLETHYSMOGRAP: ICD-10-PCS | Mod: S$GLB,,, | Performed by: INTERNAL MEDICINE

## 2022-01-07 PROCEDURE — 94729 PR C02/MEMBANE DIFFUSE CAPACITY: ICD-10-PCS | Mod: S$GLB,,, | Performed by: INTERNAL MEDICINE

## 2022-01-07 PROCEDURE — 94010 BREATHING CAPACITY TEST: CPT | Mod: S$GLB,,, | Performed by: INTERNAL MEDICINE

## 2022-01-07 PROCEDURE — 94729 DIFFUSING CAPACITY: CPT | Mod: S$GLB,,, | Performed by: INTERNAL MEDICINE

## 2022-01-07 RX ORDER — PNV NO.95/FERROUS FUM/FOLIC AC 28MG-0.8MG
1 TABLET ORAL DAILY
Qty: 30 EACH | Refills: 6 | Status: SHIPPED | OUTPATIENT
Start: 2022-01-07 | End: 2022-04-07 | Stop reason: SDUPTHER

## 2022-01-07 RX ORDER — FOLIC ACID 1 MG/1
1 TABLET ORAL DAILY
Qty: 30 TABLET | Refills: 6 | Status: SHIPPED | OUTPATIENT
Start: 2022-01-07 | End: 2022-04-07 | Stop reason: SDUPTHER

## 2022-01-07 RX ORDER — METHOTREXATE 2.5 MG/1
20 TABLET ORAL
Qty: 32 TABLET | Refills: 4 | Status: SHIPPED | OUTPATIENT
Start: 2022-01-07 | End: 2022-04-07 | Stop reason: SDUPTHER

## 2022-01-10 ENCOUNTER — OFFICE VISIT (OUTPATIENT)
Dept: PULMONOLOGY | Facility: CLINIC | Age: 72
End: 2022-01-10
Payer: MEDICARE

## 2022-01-10 VITALS
SYSTOLIC BLOOD PRESSURE: 120 MMHG | BODY MASS INDEX: 26.06 KG/M2 | RESPIRATION RATE: 18 BRPM | WEIGHT: 166 LBS | OXYGEN SATURATION: 99 % | DIASTOLIC BLOOD PRESSURE: 62 MMHG | HEART RATE: 70 BPM | HEIGHT: 67 IN

## 2022-01-10 DIAGNOSIS — R06.02 SHORTNESS OF BREATH: ICD-10-CM

## 2022-01-10 DIAGNOSIS — J84.9 INTERSTITIAL PULMONARY DISEASE, UNSPECIFIED: ICD-10-CM

## 2022-01-10 DIAGNOSIS — J84.9 INTERSTITIAL LUNG DISEASE: ICD-10-CM

## 2022-01-10 PROCEDURE — 99205 PR OFFICE/OUTPT VISIT, NEW, LEVL V, 60-74 MIN: ICD-10-PCS | Mod: S$GLB,,, | Performed by: INTERNAL MEDICINE

## 2022-01-10 PROCEDURE — 99205 OFFICE O/P NEW HI 60 MIN: CPT | Mod: S$GLB,,, | Performed by: INTERNAL MEDICINE
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
## 2022-01-31 ENCOUNTER — PATIENT MESSAGE (OUTPATIENT)
Dept: RHEUMATOLOGY | Facility: CLINIC | Age: 72
End: 2022-01-31
Payer: MEDICARE

## 2022-02-02 ENCOUNTER — PATIENT MESSAGE (OUTPATIENT)
Dept: PULMONOLOGY | Facility: CLINIC | Age: 72
End: 2022-02-02
Payer: MEDICARE

## 2022-03-21 LAB
ALBUMIN SERPL BCP-MCNC: 3.8 G/DL (ref 3.4–5)
ALBUMIN/GLOBULIN RATIO: 1.41 RATIO (ref 1.1–1.8)
ALP SERPL-CCNC: 88 U/L (ref 46–116)
ALT SERPL W P-5'-P-CCNC: 29 U/L (ref 12–78)
ANION GAP SERPL CALC-SCNC: 9 MMOL/L (ref 3–11)
AST SERPL-CCNC: 23 U/L (ref 15–37)
BASOPHILS NFR BLD: 1.3 % (ref 0–3)
BILIRUB SERPL-MCNC: 0.5 MG/DL (ref 0–1)
BUN SERPL-MCNC: 12 MG/DL (ref 7–18)
BUN/CREAT SERPL: 15.18 RATIO (ref 7–18)
CALCIUM SERPL-MCNC: 8.5 MG/DL (ref 8.8–10.5)
CHLORIDE SERPL-SCNC: 107 MMOL/L (ref 100–108)
CO2 SERPL-SCNC: 26 MMOL/L (ref 21–32)
CREAT SERPL-MCNC: 0.79 MG/DL (ref 0.55–1.02)
CREATINE KINASE, SERUM: 42 U/L (ref 26–308)
CRP QUANTITATIVE: < 0.2 MG/DL (ref 0–0.9)
EOSINOPHIL NFR BLD: 6 % (ref 1–3)
ERYTHROCYTE [DISTWIDTH] IN BLOOD BY AUTOMATED COUNT: 13.8 % (ref 12.5–18)
ERYTHROCYTE [SEDIMENTATION RATE] IN BLOOD: 19 MM/HR (ref 0–20)
GFR ESTIMATION: > 60
GLOBULIN: 2.7 G/DL (ref 2.3–3.5)
GLUCOSE SERPL-MCNC: 96 MG/DL (ref 70–110)
HCT VFR BLD AUTO: 39.2 % (ref 37–47)
HGB BLD-MCNC: 12.3 G/DL (ref 12–16)
LYMPHOCYTES NFR BLD: 40.4 % (ref 25–40)
MCH RBC QN AUTO: 31.1 PG (ref 27–31.2)
MCHC RBC AUTO-ENTMCNC: 31.4 G/DL (ref 31.8–35.4)
MCV RBC AUTO: 99 FL (ref 80–97)
MONOCYTES NFR BLD: 7.9 % (ref 1–15)
NEUTROPHILS # BLD AUTO: 2.01 10*3/UL (ref 1.8–7.7)
NEUTROPHILS NFR BLD: 44.4 % (ref 37–80)
NUCLEATED RED BLOOD CELLS: 0 %
PLATELETS: 266 10*3/UL (ref 142–424)
POTASSIUM SERPL-SCNC: 4.1 MMOL/L (ref 3.6–5.2)
PROT SERPL-MCNC: 6.5 G/DL (ref 6.4–8.2)
RBC # BLD AUTO: 3.96 10*6/UL (ref 4.2–5.4)
SODIUM BLD-SCNC: 142 MMOL/L (ref 135–145)
WBC # BLD: 4.5 10*3/UL (ref 4.6–10.2)

## 2022-03-24 NOTE — PROGRESS NOTES
Subjective:      Patient ID: Alysa Abernathy is a 71 y.o. female.    Chief Complaint: Disease Management    HPI:   Includes joint pain without subjective swelling.   Antecedent event includes: None;  Pain location includes: shoulders, hands and feet;  Gradual onset; beginning >years ago;  Intermittent ache, Mild in severity,   Lasting >minutes, Worse during the daytime;   Improved with rest, medication, change in position and none;   Worsened with activity and overuse;     Review of Systems   Constitutional: Negative for chills, fatigue, fever and unexpected weight change.   HENT: Negative for nasal congestion, ear pain, hearing loss, mouth dryness, mouth sores, nosebleeds and trouble swallowing.    Eyes: Negative for photophobia, pain, visual disturbance and eye dryness.   Respiratory: Positive for shortness of breath.    Cardiovascular: Negative for chest pain, palpitations and leg swelling.   Gastrointestinal: Negative for abdominal distention, abdominal pain, blood in stool, constipation, diarrhea, rectal pain, vomiting and fecal incontinence.   Endocrine: Negative for polydipsia and polyuria.   Genitourinary: Negative for bladder incontinence, dysuria, enuresis, genital sores and hematuria.   Musculoskeletal: Positive for arthralgias. Negative for joint swelling and myalgias.   Integumentary:  Negative for rash, wound and mole/lesion.   Neurological: Negative for weakness and headaches.   Hematological: Negative for adenopathy. Does not bruise/bleed easily.   Psychiatric/Behavioral: Negative for dysphoric mood and sleep disturbance. The patient is not nervous/anxious.       Past Medical History:   Diagnosis Date    Arthritis     Cataract     Dry mouth     Thyroid disease      Past Surgical History:   Procedure Laterality Date    EYE SURGERY      HYSTERECTOMY      TONSILLECTOMY        See the Assessment/Plan for further characterization of the HPI, ROS, Medical, Surgical, Family, and Social Histories  "including Tobacco use, Meds; all of which has been reviewed in Epic.    Medication List with Changes/Refills   New Medications    HYDROXYCHLOROQUINE (PLAQUENIL) 200 MG TABLET    Take 1 tablet (200 mg total) by mouth once daily.   Current Medications    ALBUTEROL (PROVENTIL) 5 MG/ML NEBULIZER SOLUTION    Take 2.5 mg by nebulization every 6 (six) hours as needed for Wheezing. Rescue    LEVOTHYROXINE (SYNTHROID) 75 MCG TABLET    Take 75 mcg by mouth once daily.    PREDNISONE (DELTASONE) 10 MG TABLET    Take 1 tablet (10 mg total) by mouth daily as needed (pain with swelling).   Changed and/or Refilled Medications    Modified Medication Previous Medication    CALCIUM CARBONATE-VITAMIN D3 (CALCIUM 500 + D) 500 MG-10 MCG (400 UNIT) TAB calcium carbonate-vitamin D3 (CALCIUM 500 + D) 500 mg-10 mcg (400 unit) Tab       Take 1 tablet by mouth 2 (two) times a day.    Take 1 tablet by mouth once daily.    FOLIC ACID (FOLVITE) 1 MG TABLET folic acid (FOLVITE) 1 MG tablet       Take 1 tablet (1 mg total) by mouth once daily.    Take 1 tablet (1 mg total) by mouth once daily.    METHOTREXATE 2.5 MG TAB methotrexate 2.5 MG Tab       Take 8 tablets (20 mg total) by mouth every 7 days.    Take 8 tablets (20 mg total) by mouth every 7 days.         Objective:   /64   Pulse 85   Resp 16   Ht 5' 7" (1.702 m)   Wt 73.9 kg (163 lb)   SpO2 99%   BMI 25.53 kg/m²   Physical Exam  Non-toxic appearance. No distress.   Normocephalic and atraumatic.   External ears normal.    Conjunctivae and EOM are normal. No scleral icterus.   RRR, No friction rub; palpable distal pulses.    No tachypnea or signs of respiratory distress.   Abdominal: No guarding or rebound tenderness.   Neurological: Oriented. Normal thought content. No cranial nerve deficit. Strength is grossly normal without focal deficit.  Skin is warm. No pallor.   Musculoskeletal: DJD. see below for further input. No overt synovitis.     CT Chest Without Contrast              "                   RADIOLOGY REPORT        PT NAME: IBAN HERNANDEZ      [facility]     : 1950 F 71             [address]    ACCT: [id number]                                              [place], [place]    [facility] Rec #: [id number]                                        [id number]    [facility]             Procedure: CHEST THORAX WO CONT    REQUISITION #: [id number]      REPORT #: [id number]           DATE OF EXAM: 22    TIME OF EXAM: 1400       CT CHEST        CMS MANDATED QUALITY DATA CT RADIATION 436    *All CT scans at this facility uses dose modulation and/or weight-based   dosing when appropriate to reduce radiation dose to as low as reasonably   achievable.        Clinical history: Interstitial pulmonary disease        Technique:    Acquisition: Contiguous scan slices were obtained from the apex of the lungs   through the diaphragms.    Reconstructions: Axial, coronal and sagittal. reconstructions of the data   set were obtained.    Contrast:    IV: No contrast was administered.    Estimated radiation dose: 11.41 mSv.        Comparison: No prior relevant studies are available.        Findings:        Lungs, pleura and large airways:    Mild interstitial thickening is noted in a peripheral distribution with   subpleural sparing. The pattern is most consistent with NSIP with no areas   of honeycombing or evidence of significant fibrotic disease.        Mild bronchiectasis is noted, mostly in the lower lung fields.        No suspicious pulmonary nodules or masses.        Heart: No cardiomegaly. No pericardial effusion.        Systemic vasculature: Normal.        Pulmonary vasculature: Grossly normal.        Mediastinal and hilar structures: No mediastinal mass. No enlarged or   morphologically abnormal mediastinal or hilar lymph nodes.        Chest wall, axilla and lower neck: The thyroid gland is normal. The lower   neck soft tissues are unremarkable. No  axillary lesions.        Upper abdomen: Normal        Osseous structures: An age-indeterminate compression deformity is noted at   T12. There is loss in several of the mid thoracic vertebral body heights   which is suspected degenerative. The bones are diffusely demineralized.        Impression        1.  Peripheral interstitial thickening with subpleural sparing in a pattern    most consistent with NSIP.        2.  No evidence of significant fibrotic disease.                DICTATING PHYSICIAN:  NATALIA COOPER MD                   Date Dictated: 02/11/22 1351        Signed By:  NATALIA COOPER MD <Electronically signed by NATALIA COOPER MD in OV>    Date Signed:  02/11/22 1355     CC: FELICIA ANDERSON MD ; FELICIA ANDERSON MD      ADMITTING PHYSICIAN:                                                                                                    ORDERING PHY: FELICIA ANDERSON MD                                                                                                                                                      ATTENDING PHY: FELICIA ANDERSON MD    Patient Status:  REG CLI    Admit Service Date: 02/11/22         Orders Only on 03/21/2022   Component Date Value Ref Range Status    Sodium 03/21/2022 142  135 - 145 mmol/L Final    Potassium 03/21/2022 4.1  3.6 - 5.2 mmol/L Final    Chloride 03/21/2022 107  100 - 108 mmol/L Final    CO2 03/21/2022 26  21 - 32 mmol/L Final    Anion Gap 03/21/2022 9.0  3.0 - 11.0 mmol/L Final    BUN 03/21/2022 12  7 - 18 mg/dL Final    Creatinine 03/21/2022 0.79  0.55 - 1.02 mg/dL Final    GFR ESTIMATION 03/21/2022 > 60  >60 Final               DESCRIPTION       GLOMERULAR FILTRATION RATE             NORMAL                     >60             KIDNEY  DISEASE           15-60             KIDNEY FAILURE             <15    BUN/Creatinine Ratio 03/21/2022 15.18  7 - 18 Ratio Final    Glucose 03/21/2022 96  70 - 110 mg/dL Final    Calcium 03/21/2022 8.5  (A) 8.8 - 10.5 mg/dL Final    Total Bilirubin 03/21/2022 0.5  0.0 - 1.0 mg/dL Final    AST 03/21/2022 23  15 - 37 U/L Final    ALT 03/21/2022 29  12 - 78 U/L Final    Total Protein 03/21/2022 6.5  6.4 - 8.2 g/dL Final    Albumin 03/21/2022 3.8  3.4 - 5.0 g/dL Final    Globulin 03/21/2022 2.7  2.3 - 3.5 g/dL Final    Albumin/Globulin Ratio 03/21/2022 1.407  1.1 - 1.8 Ratio Final    Alkaline Phosphatase 03/21/2022 88  46 - 116 U/L Final    CK, Serum 03/21/2022 42  26 - 308 U/L Final    CRP QUANTITATIVE 03/21/2022 < 0.2  0.0 - 0.9 mg/dL Final   Orders Only on 03/21/2022   Component Date Value Ref Range Status    WBC 03/21/2022 4.5 (A) 4.6 - 10.2 10*3/uL Final    RBC 03/21/2022 3.96 (A) 4.2 - 5.4 10*6/uL Final    Hemoglobin 03/21/2022 12.3  12.0 - 16.0 g/dL Final    Hematocrit 03/21/2022 39.2  37.0 - 47.0 % Final    MCV 03/21/2022 99.0 (A) 80 - 97 fL Final    MCH 03/21/2022 31.1  27.0 - 31.2 pg Final    MCHC 03/21/2022 31.4 (A) 31.8 - 35.4 g/dL Final    RDW RBC Auto-Rto 03/21/2022 13.8  12.5 - 18.0 % Final    Platelets 03/21/2022 266  142 - 424 10*3/uL Final    Neutrophils 03/21/2022 44.4  37 - 80 % Final    Lymphocytes 03/21/2022 40.4 (A) 25 - 40 % Final    Monocytes 03/21/2022 7.9  1 - 15 % Final    Eosinophils 03/21/2022 6.0 (A) 1 - 3 % Final    Basophils 03/21/2022 1.3  0 - 3 % Final    nRBC# 03/21/2022 0.0  % Final    Neutrophils Absolute 03/21/2022 2.01  1.8 - 7.7 10*3/uL Final    Sed Rate 03/21/2022 19  0 - 20 mm/hr Final   Orders Only on 12/13/2021   Component Date Value Ref Range Status    Sodium 12/13/2021 139  135 - 145 mmol/L Final    Potassium 12/13/2021 4.4  3.6 - 5.2 mmol/L Final    Chloride 12/13/2021 102  100 - 108 mmol/L Final    CO2 12/13/2021 29  21 - 32 mmol/L Final    Anion Gap 12/13/2021 8.0  3.0 - 11.0 mmol/L Final    BUN 12/13/2021 13  7 - 18 mg/dL Final    Creatinine 12/13/2021 0.73  0.55 - 1.02 mg/dL Final    GFR ESTIMATION 12/13/2021 > 60  >60 Final                DESCRIPTION       GLOMERULAR FILTRATION RATE             NORMAL                     >60             KIDNEY  DISEASE           15-60             KIDNEY FAILURE             <15    BUN/Creatinine Ratio 12/13/2021 17.80  7 - 18 Ratio Final    Glucose 12/13/2021 98  70 - 110 mg/dL Final    Calcium 12/13/2021 9.0  8.8 - 10.5 mg/dL Final    Total Bilirubin 12/13/2021 0.6  0.0 - 1.0 mg/dL Final    AST 12/13/2021 26  15 - 37 U/L Final    ALT 12/13/2021 35  12 - 78 U/L Final    Total Protein 12/13/2021 6.6  6.4 - 8.2 g/dL Final    Albumin 12/13/2021 3.9  3.4 - 5.0 g/dL Final    Globulin 12/13/2021 2.7  2.3 - 3.5 g/dL Final    Albumin/Globulin Ratio 12/13/2021 1.444  1.1 - 1.8 Ratio Final    Alkaline Phosphatase 12/13/2021 76  46 - 116 U/L Final    CK, Serum 12/13/2021 35  26 - 308 U/L Final    CRP QUANTITATIVE 12/13/2021 < 0.2  0.0 - 0.9 mg/dL Final   Orders Only on 12/13/2021   Component Date Value Ref Range Status    WBC 12/13/2021 8.6  4.6 - 10.2 10*3/uL Final    RBC 12/13/2021 3.82 (A) 4.2 - 5.4 10*6/uL Final    Hemoglobin 12/13/2021 12.3  12.0 - 16.0 g/dL Final    Hematocrit 12/13/2021 39.7  37.0 - 47.0 % Final    MCV 12/13/2021 103.9 (A) 80 - 97 fL Final    MCH 12/13/2021 32.2 (A) 27.0 - 31.2 pg Final    MCHC 12/13/2021 31.0 (A) 31.8 - 35.4 g/dL Final    RDW RBC Auto-Rto 12/13/2021 14.9  12.5 - 18.0 % Final    Platelets 12/13/2021 299  142 - 424 10*3/uL Final    Neutrophils 12/13/2021 69.4  37 - 80 % Final    Lymphocytes 12/13/2021 23.0 (A) 25 - 40 % Final    Monocytes 12/13/2021 4.3  1 - 15 % Final    Eosinophils 12/13/2021 2.0  1 - 3 % Final    Basophils 12/13/2021 0.7  0 - 3 % Final    nRBC# 12/13/2021 0.0  % Final    Neutrophils Absolute 12/13/2021 5.95  1.8 - 7.7 10*3/uL Final    Macrocytes 12/13/2021 1+   Final    Sed Rate 12/13/2021 20  0 - 20 mm/hr Final   Orders Only on 08/16/2021   Component Date Value Ref Range Status    Hypochromia 08/16/2021 1+   Final    WBC 08/16/2021  8.7  4.6 - 10.2 10*3/uL Final    RBC 08/16/2021 4.07 (A) 4.2 - 5.4 10*6/uL Final    Hemoglobin 08/16/2021 12.0  12.0 - 16.0 g/dL Final    Hematocrit 08/16/2021 39.0  37.0 - 47.0 % Final    MCV 08/16/2021 95.8  80 - 97 fL Final    MCH 08/16/2021 29.5  27.0 - 31.2 pg Final    MCHC 08/16/2021 30.8 (A) 31.8 - 35.4 g/dL Final    RDW RBC Auto-Rto 08/16/2021 16.4  12.5 - 18.0 % Final    Platelets 08/16/2021 322  142 - 424 10*3/uL Final    Neutrophils 08/16/2021 55.9  37 - 80 % Final    Lymphocytes 08/16/2021 32.9  25 - 40 % Final    Monocytes 08/16/2021 5.8  1 - 15 % Final    Eosinophils 08/16/2021 3.8 (A) 1 - 3 % Final    Basophils 08/16/2021 1.0  0 - 3 % Final    nRBC# 08/16/2021 0.0  % Final    Neutrophils Absolute 08/16/2021 4.86  1.8 - 7.7 10*3/uL Final    Sed Rate 08/16/2021 45 (A) 0 - 20 mm/hr Final   Orders Only on 08/16/2021   Component Date Value Ref Range Status    CRP QUANTITATIVE 08/16/2021 0.5  0.0 - 0.9 mg/dL Final    Sodium 08/16/2021 141  135 - 145 mmol/L Final    Potassium 08/16/2021 4.3  3.6 - 5.2 mmol/L Final    Chloride 08/16/2021 105  100 - 108 mmol/L Final    CO2 08/16/2021 30  21 - 32 mmol/L Final    Anion Gap 08/16/2021 6.0  3.0 - 11.0 mmol/L Final    BUN 08/16/2021 16  7 - 18 mg/dL Final    Creatinine 08/16/2021 0.72  0.55 - 1.02 mg/dL Final    GFR ESTIMATION 08/16/2021 > 60  >60 Final               DESCRIPTION       GLOMERULAR FILTRATION RATE             NORMAL                     >60             KIDNEY  DISEASE           15-60             KIDNEY FAILURE             <15    BUN/Creatinine Ratio 08/16/2021 22.22 (A) 7 - 18 Ratio Final    Glucose 08/16/2021 91  70 - 110 mg/dL Final    Calcium 08/16/2021 8.8  8.8 - 10.5 mg/dL Final    Total Bilirubin 08/16/2021 0.5  0.0 - 1.0 mg/dL Final    AST 08/16/2021 17  15 - 37 U/L Final    ALT 08/16/2021 22  12 - 78 U/L Final    Total Protein 08/16/2021 6.9  6.4 - 8.2 g/dL Final    Albumin 08/16/2021 3.6  3.4 - 5.0 g/dL Final     Globulin 08/16/2021 3.3  2.3 - 3.5 g/dL Final    Albumin/Globulin Ratio 08/16/2021 1.090 (A) 1.1 - 1.8 Ratio Final    Alkaline Phosphatase 08/16/2021 80  46 - 116 U/L Final    CK, Serum 08/16/2021 24 (A) 26 - 308 U/L Final   Orders Only on 07/07/2021   Component Date Value Ref Range Status    Hepatitis B Core Ab Total 07/07/2021 NEGATIVE  Negative Final    Comment: INTERPRETIVE INFORMATION: Hepatitis B Core Ab (Total)This assay should not be used for blood donor screening,associated re-entry protocols, or for screening Human Cells,Tissues and Cellular and Tissue-Based Products (HCT/P).Performed By: [facility]   [id number] [place] [id number] Director: Priscilla Crystal MD, MS     Orders Only on 07/07/2021   Component Date Value Ref Range Status    Hep B S Ab 07/07/2021 Nonreactive  Nonreactive Final    Hepatitis B Surface Ag 07/07/2021 Nonreactive  Nonreactive Final   Orders Only on 07/07/2021   Component Date Value Ref Range Status    WBC 07/07/2021 11.5 (A) 4.6 - 10.2 10*3/uL Final    RBC 07/07/2021 4.63  4.2 - 5.4 10*6/uL Final    Hemoglobin 07/07/2021 13.6  12.0 - 16.0 g/dL Final    Hematocrit 07/07/2021 42.8  37.0 - 47.0 % Final    MCV 07/07/2021 92.4  80 - 97 fL Final    MCH 07/07/2021 29.4  27.0 - 31.2 pg Final    MCHC 07/07/2021 31.8  31.8 - 35.4 g/dL Final    RDW RBC Auto-Rto 07/07/2021 17.2  12.5 - 18.0 % Final    Platelets 07/07/2021 381  142 - 424 10*3/uL Final    Neutrophils 07/07/2021 80.6 (A) 37 - 80 % Final    Lymphocytes 07/07/2021 12.5 (A) 25 - 40 % Final    Monocytes 07/07/2021 5.1  1 - 15 % Final    Eosinophils 07/07/2021 0.7 (A) 1 - 3 % Final    Basophils 07/07/2021 0.5  0 - 3 % Final    nRBC# 07/07/2021 0.0  % Final    Neutrophils Absolute 07/07/2021 9.29 (A) 1.8 - 7.7 10*3/uL Final    Sed Rate 07/07/2021 21 (A) 0 - 20 mm/hr Final   Orders Only on 07/07/2021   Component Date Value Ref Range Status    CRP QUANTITATIVE 07/07/2021 < 0.2  0.0 -  0.9 mg/dL Final    Sodium 07/07/2021 142  135 - 145 mmol/L Final    Potassium 07/07/2021 3.6  3.6 - 5.2 mmol/L Final    Chloride 07/07/2021 102  100 - 108 mmol/L Final    CO2 07/07/2021 28  21 - 32 mmol/L Final    Anion Gap 07/07/2021 12.0 (A) 3.0 - 11.0 mmol/L Final    BUN 07/07/2021 14  7 - 18 mg/dL Final    Creatinine 07/07/2021 0.77  0.55 - 1.02 mg/dL Final    GFR ESTIMATION 07/07/2021 > 60  >60 Final               DESCRIPTION       GLOMERULAR FILTRATION RATE             NORMAL                     >60             KIDNEY  DISEASE           15-60             KIDNEY FAILURE             <15    BUN/Creatinine Ratio 07/07/2021 18.18 (A) 7 - 18 Ratio Final    Glucose 07/07/2021 96  70 - 110 mg/dL Final    Calcium 07/07/2021 9.0  8.8 - 10.5 mg/dL Final    Total Bilirubin 07/07/2021 0.5  0.0 - 1.0 mg/dL Final    AST 07/07/2021 14 (A) 15 - 37 U/L Final    ALT 07/07/2021 21  12 - 78 U/L Final    Total Protein 07/07/2021 8.2  6.4 - 8.2 g/dL Final    Albumin 07/07/2021 4.2  3.4 - 5.0 g/dL Final    Globulin 07/07/2021 4.0 (A) 2.3 - 3.5 g/dL Final    Albumin/Globulin Ratio 07/07/2021 1.050 (A) 1.1 - 1.8 Ratio Final    Alkaline Phosphatase 07/07/2021 131 (A) 46 - 116 U/L Final    CK, Serum 07/07/2021 25 (A) 26 - 308 U/L Final   There may be more visits with results that are not included.        All of the data above and below has been reviewed by myself and any further interpretations will be reflected in the assessment and plan.   The data includes review of external notes, and independent interpretation of lab results, x-rays, and imaging reports.  Active inflammatory arthritis is an illness that poses a threat to bodily function and even a threat to life in some cases.  Drug therapy to treat inflammatory arthritis usually requires intensive monitoring for toxicity.    Review of patient's allergies indicates:  No Known Allergies  Assessment/Plan:       Visit prior to Visit prior to Visit prior to last  visit:     lab 4/14/21:  SPEP alpha 2 slight high  Serum MARIANNA normal  Ig level normal   HLAB27 neg  TB neg  PRUDENCE neg  +  C3 161  C4 31  Creat 0.6; alb 4.2;  CCP not done  TSH 0.2 low T4 2  CPK 29  WBC 8.2; Hgb 12.3 MCV 94; plt 302  ESR 68; CRP 2.715     records    4/26/21: Rheumatoid arthritis, Dupuytrens, trigger fingers: tendon sheath and finger injections   1/16/20, 3/20/20; 5/21/20,6/30/20, 9/29/20, 12/8/20; 2/23/21: each visit injections in hand joints and or wrist elbow, dequervains etc.     12/18/20: CT chest with: mild ILD predominantly subpleural lower lungs, small hiatal hernia  7/25/18 MRI left shoulder: partial tears supra/infraspinatus tendons; moderate tendinosis long head biceps tendon; glenohumeral and AC DJD; changes suggestive adhesive capsulitis     Moderate to severe disease activity  Mild to moderate deformities  Mild to Moderately impaired functional status  Fair prognosis     She failed Methotrexate per history but also stopped and felt not helping     Infliximab 3mg/kg 0,2,6 then q 8wks, hold if infection     Resume MTX 7.5mg PO weekly  Folic acid 1mg daily     Resume prednisone 10mg qam prn  We will retry to seek records Dr. Lino but sounds like only one tele visit)     Visit prior to Last visit:     ESR 21; CRP normal     Prior plan for   Infliximab 3mg/kg 0,2,6 then q 8wks, hold if infection but  She rather never got the infusion and didn't think to call us     MTX 7.5mg PO weekly  Folic acid 1mg daily  Prior plan Resume prednisone 10mg qam prn     No longer using celebrex 200mg daily prn as she ran out and we will refill     She is currently on antibiotic from PCP for a cough and should hold the Methotrexate until symptoms resolve     No overt synovitis but she is on prednisone 10mg and the MTX  No overt pneumonia on exam but does have some diminished breath sounds and crackles in base;   her lung exam is unchanged from previous     She has been reluctant to see Pulmonary for  the Interstitial Lung disease   as she wanted to work on her rheumatoid arthritis first and   then see Pulmonary which   I explained she should rather do both that will optimize her outcome;   As she needs regular monitoring CT chest PFT's exam etc;    Otherwise it may be confusing if she has symptoms of infection and will potentially put a hold on her treatment for the rheumatoid.     We will seek records from PCP and they will try to keep us in the loop moving forward  We will send today's note to PCP      Dara will look into getting the infusion set up; the orders were written and sent; maybe they had trouble getting in touch with her or other?     Infliximab 3mg/kg 0,2,6 then q 8wks, hold if infection     Hold Methotrexate for now until symptoms of infection resolves     Resume Celebrex 200mg daily prn     Contact PCP today to see about seeing/getting reestablished with Pulmonary for monitoring;  She should also see Dermatology for regular skin exam given the [facility] sun damage scar including forearms    She will plan to contact us for any problem or question including running out of meds or not getting      Visit prior to Last visit:     ESR 45 CRP<0.3     Infliximab 3mg/kg 0,2,6 then q 8wks, hold if infection     Prior plan Hold Methotrexate for now until symptoms of infection resolves  MTX 7.5mg PO weekly  Folic acid 1mg daily     Celebrex 200mg daily prn     She has been doing much better     She had 2 of the infusions     Some pain weakness in wrists and ankles     She is still using prednisone 10mg qam and she is some leery of decreasing as she is doing so well     She hurt her low back lifting something and went to urgent care and has improved     She uses ibuprofen instead of celebrex but still has celebrex and can use either but not both at same time     No overt synovitis  Appears mostly in remission with mild to moderate deformity  Lungs sound better      Cont inflextra infusions     Increase  Methotrexate 15mg PO weekly     Cont folic acid 1mg daily     Cont prednisone 10mg qam for 2 weeks then attempt to decrease to 5mg as tolerated per response;   If doing well on 5mg for weeks more she can attempt to decrease to every other day and/or stop altogether  If problem decreasing can resume prior dose and call us     Revisit 4 months with lab 2 weeks prior     Contact us prn     Last visit:     interim lab:        Lab Results   Component Value Date      12/13/2021     K 4.4 12/13/2021      12/13/2021     CO2 29 12/13/2021     ANIONGAP 8.0 12/13/2021     GLU 98 12/13/2021     CALCIUM 9.0 12/13/2021     BUN 13 12/13/2021     CREATININE 0.73 12/13/2021     PROT 6.6 12/13/2021     AST 26 12/13/2021     ALT 35 12/13/2021     BILITOT 0.6 12/13/2021     ALKPHOS 76 12/13/2021     WBC 8.6 12/13/2021     NEUTROPHILS 69.4 12/13/2021     HGB 12.3 12/13/2021     .9 (H) 12/13/2021     LABPLAT 299 12/13/2021     SEDRATE 20 12/13/2021     WBCUA 0-2 07/07/2021     RBCUA 0-2 07/07/2021     BACTERIA +/- Rare 07/07/2021     PROTEINUA Trace (A) 07/07/2021            ESR 20 CRP<0.2     Infliximab 3mg/kg 0,2,6 then q 8wks, hold if infection     Methotrexate 15mg PO weekly     Cont folic acid 1mg daily     Celebrex 200mg daily prn     Cont prednisone 10mg qam for 2 weeks then attempt to decrease to 5mg as tolerated per response;   If doing well on 5mg for weeks more she can attempt to decrease to every other day and/or stop altogether  If problem decreasing can resume prior dose and call us     She has some interim swelling in hands but she has been doing much better     Some interim vision problems and we will refer to Ophthalmology for further evaluation and also for plaquenil exam     She stopped celebrex as occasional ibuprofen is adequate but I think she is still using intermittent prednisone and I would like to if she can wean that first     She has been back to using prednisone 10mg which I went over  tapering plan in future; down to 5mg for weeks and if doing well 5mg qod few weeks and if doing well stop; if not doing well go back to previous dose and contact us     She is more interested in seeing Pulmonary and we will refer and also get PFTs spirometry     No overt synovitis  Appears to be in remission but still needing prednisone  Lungs some crackles left base and some diminished breath sounds but her lung exam sounds better     Increase Methotrexate 20mg PO weekly  folic acid 1mg daily     Infliximab 3mg/kg 0,2,6 then q 8wks, hold if infection     Prednisone as per above     DEXA to further evaluate     Add Calcium D daily     we will refer to Ophthalmology for further evaluation of vision problems and also for plaquenil exam     We will refer to Pulmonary     PFTs spirometry     Revisit 3 months with lab 2 weeks prior     Contact us prn     Addendum 2/22/22: I received order to rewrite orders for her infusion today and I am going to increase the dose to 4mg/kg with the goal of getting her off prednisone in the future; We can always consider decreasing the dose in the future if doing well       This visit:    Interim lab:  Lab Results   Component Value Date     03/21/2022    K 4.1 03/21/2022     03/21/2022    CO2 26 03/21/2022    ANIONGAP 9.0 03/21/2022    GLU 96 03/21/2022    CALCIUM 8.5 (L) 03/21/2022    BUN 12 03/21/2022    CREATININE 0.79 03/21/2022    PROT 6.5 03/21/2022    AST 23 03/21/2022    ALT 29 03/21/2022    BILITOT 0.5 03/21/2022    ALKPHOS 88 03/21/2022    WBC 4.5 (L) 03/21/2022    NEUTROPHILS 44.4 03/21/2022    HGB 12.3 03/21/2022    MCV 99.0 (H) 03/21/2022    LABPLAT 266 03/21/2022    SEDRATE 19 03/21/2022    WBCUA 0-2 07/07/2021    RBCUA 0-2 07/07/2021    BACTERIA +/- Rare 07/07/2021    PROTEINUA Trace (A) 07/07/2021       Ca 8.5    ESR 19 CRP<0.2      Interim 2/03/22 DEXA:    total lumbar T-score of -2.2.     right femoral neck T-score of 2.0.    right femur T-score of -2.3.     left femoral neck T-score of -1.9.    total left femur T-score of -2.0.            Interim 2022 PFTs spirometry: Spirometry data is acceptable and reproducible. Patient unable to reach plateau.  Quality grading A-C acceptable. Atleast two acceptable DLCO measurements  within 2 ml/min/mmHg of each other. No post bronchodilator test performed at  this time. Good patient effort.  1) Normal spirometry.  2) Lung volumes are normal.  3) Mildly reduced DLCO that corrects for alveolar volume.    She has been back to using prednisone 10mg which I went over tapering plan in future; down to 5mg for weeks and if doing well 5mg qod few weeks and if doing well stop; if not doing well go back to previous dose and contact us prev noted    Methotrexate 20mg PO weekly  folic acid 1mg daily    Celebrex 200mg daily prn     Infliximab 4mg/kg q 8wks, hold if infection    Calcium D daily    She has been off of prednisone for months but she has been having a lot of pain in her hands    She also no longer uses celebrex;     uses otc ibuprofen instead that she prefers    She tells me Ophthalmology cleared her for the plaquenil    No overt synovitis but she is having a lot of pain    Verbal education and some written    Went over interim lab and studies    She will consider revisiting ortho for the hand pain    Add plaquenil 200mg daily    Cont other:  Methotrexate 20mg PO weekly  folic acid 1mg daily    Ibuprofen otc she prefers over celebrex     Infliximab 4mg/kg q 8wks, hold if infection        She should increase the Calcium D to bid every other day     She would rather not have further treatment for the osteopenia bordering osteoporosis    Revisit 2-3 months    Call if not improving or question     Contact us prn       + CCP 41.08+ PRUDENCE neg [id number] neg uric acid 5.2 HCV neg HepBsAg negsAb neg coreAb neg  Rheumatoid arthritis     initial visit noted:  (Pain and swelling including hands and feet  Initially working with Dr. Alvarez  "getting "11 injections" including finger joints wrist;   also was injected for trigger fingers  She had a lot of relief from the injections including in other painful areas of her body  Most recent injections were 3 weeks ago  She notes that her joint swelling and stiffness is not as bad today; attributes to steroids  Also getting oral prednisone and medrol going back to 5/21/19 per records from PCP Dr. Linn; also CCP41+ 5/22/20  No overt synovitis; DJD generalized including hands)     12/18/20: CT chest with: mild ILD predominantly subpleural lower lungs, small hiatal hernia  7/25/18 MRI left shoulder: partial tears spinatus tendons; moderate tendinosis long head biceps tendon; glenohumeral and AC DJD; changes suggestive adhesive capsulitis     Prev noted:(She has been reluctant to see Pulmonary for the Interstitial Lung disease   as she wanted to work on her rheumatoid arthritis first and   then see Pulmonary which   I explained she should rather do both that will optimize her outcome;   As she needs regular monitoring CT chest PFT's exam etc;    Otherwise it may be confusing if she has symptoms of infection and will potentially put a hold on her treatment for the rheumatoid.)     2021 TB negative     Prior left shoulder injection     Had seen Dr. Lino and given MTX for 3 months that "did not help at all"  Has also been working  records    4/26/21: Rheumatoid arthritis, Dupuytrens, trigger fingers: tendon sheath and finger injections   1/16/20, 3/20/20; 5/21/20,6/30/20, 9/29/20, 12/8/20; 2/23/21: each visit injections in hand joints and or wrist elbow, dequervains etc.     Has been managing with     Prior mobic     Prior oral prednisone  Steroid injections as per records  We refilled celebrex 200mg daily prn prev noted      Prev and again noted: (Rosacea on face with some Moon facies  Actinic sun damage scar including forearms   she should have dermatology evaluation at her earliest " convenience)     Review of medical records as stated above.  Lab +/- imaging and other ordered diagnostic studies to further evaluate.  See the orders associated with this note visit.  Medications as prescribed as tolerated.    Education including verbal and those noted in the patient instructions.  Revisit as scheduled and call prn.    The following diagnoses influence medical decision making and/or need further workup including the orders listed below:    Rheumatoid arthritis involving multiple sites with positive rheumatoid factor  -     CBC Auto Differential; Future; Expected date: 06/20/2022  -     CK; Future; Expected date: 06/20/2022  -     Comprehensive Metabolic Panel; Future; Expected date: 06/20/2022  -     C-Reactive Protein; Future; Expected date: 06/20/2022  -     Sedimentation rate; Future; Expected date: 06/20/2022  -     Urinalysis; Future; Expected date: 06/20/2022  -     Quantiferon Gold TB; Future; Expected date: 06/20/2022  -     methotrexate 2.5 MG Tab; Take 8 tablets (20 mg total) by mouth every 7 days.  Dispense: 32 tablet; Refill: 4  -     folic acid (FOLVITE) 1 MG tablet; Take 1 tablet (1 mg total) by mouth once daily.  Dispense: 30 tablet; Refill: 6  -     hydrOXYchloroQUINE (PLAQUENIL) 200 mg tablet; Take 1 tablet (200 mg total) by mouth once daily.  Dispense: 30 tablet; Refill: 4    Interstitial lung disease  -     CBC Auto Differential; Future; Expected date: 06/20/2022  -     CK; Future; Expected date: 06/20/2022  -     Comprehensive Metabolic Panel; Future; Expected date: 06/20/2022  -     C-Reactive Protein; Future; Expected date: 06/20/2022  -     Sedimentation rate; Future; Expected date: 06/20/2022  -     Urinalysis; Future; Expected date: 06/20/2022  -     Quantiferon Gold TB; Future; Expected date: 06/20/2022    Other osteoarthritis involving multiple joints  -     CBC Auto Differential; Future; Expected date: 06/20/2022  -     CK; Future; Expected date: 06/20/2022  -      Comprehensive Metabolic Panel; Future; Expected date: 06/20/2022  -     C-Reactive Protein; Future; Expected date: 06/20/2022  -     Sedimentation rate; Future; Expected date: 06/20/2022  -     Urinalysis; Future; Expected date: 06/20/2022  -     Quantiferon Gold TB; Future; Expected date: 06/20/2022    High risk medication use  -     CBC Auto Differential; Future; Expected date: 06/20/2022  -     CK; Future; Expected date: 06/20/2022  -     Comprehensive Metabolic Panel; Future; Expected date: 06/20/2022  -     C-Reactive Protein; Future; Expected date: 06/20/2022  -     Sedimentation rate; Future; Expected date: 06/20/2022  -     Urinalysis; Future; Expected date: 06/20/2022  -     Quantiferon Gold TB; Future; Expected date: 06/20/2022  -     methotrexate 2.5 MG Tab; Take 8 tablets (20 mg total) by mouth every 7 days.  Dispense: 32 tablet; Refill: 4  -     folic acid (FOLVITE) 1 MG tablet; Take 1 tablet (1 mg total) by mouth once daily.  Dispense: 30 tablet; Refill: 6    Rotator cuff dysfunction, left  -     CBC Auto Differential; Future; Expected date: 06/20/2022  -     CK; Future; Expected date: 06/20/2022  -     Comprehensive Metabolic Panel; Future; Expected date: 06/20/2022  -     C-Reactive Protein; Future; Expected date: 06/20/2022  -     Sedimentation rate; Future; Expected date: 06/20/2022  -     Urinalysis; Future; Expected date: 06/20/2022  -     Quantiferon Gold TB; Future; Expected date: 06/20/2022    Long term current use of systemic steroids  -     CBC Auto Differential; Future; Expected date: 06/20/2022  -     CK; Future; Expected date: 06/20/2022  -     Comprehensive Metabolic Panel; Future; Expected date: 06/20/2022  -     C-Reactive Protein; Future; Expected date: 06/20/2022  -     Sedimentation rate; Future; Expected date: 06/20/2022  -     Urinalysis; Future; Expected date: 06/20/2022  -     Quantiferon Gold TB; Future; Expected date: 06/20/2022  -     calcium carbonate-vitamin D3 (CALCIUM 500  + D) 500 mg-10 mcg (400 unit) Tab; Take 1 tablet by mouth 2 (two) times a day.  Dispense: 60 each; Refill: 4    Screening-pulmonary TB  -     Quantiferon Gold TB; Future; Expected date: 06/20/2022    Osteopenia of multiple sites  -     calcium carbonate-vitamin D3 (CALCIUM 500 + D) 500 mg-10 mcg (400 unit) Tab; Take 1 tablet by mouth 2 (two) times a day.  Dispense: 60 each; Refill: 4      Follow up in about 3 months (around 7/7/2022).     Leonard Shelton MD

## 2022-04-07 ENCOUNTER — OFFICE VISIT (OUTPATIENT)
Dept: RHEUMATOLOGY | Facility: CLINIC | Age: 72
End: 2022-04-07
Payer: MEDICARE

## 2022-04-07 VITALS
HEART RATE: 85 BPM | OXYGEN SATURATION: 99 % | WEIGHT: 163 LBS | HEIGHT: 67 IN | SYSTOLIC BLOOD PRESSURE: 124 MMHG | BODY MASS INDEX: 25.58 KG/M2 | DIASTOLIC BLOOD PRESSURE: 64 MMHG | RESPIRATION RATE: 16 BRPM

## 2022-04-07 DIAGNOSIS — M85.89 OSTEOPENIA OF MULTIPLE SITES: ICD-10-CM

## 2022-04-07 DIAGNOSIS — J84.9 INTERSTITIAL LUNG DISEASE: ICD-10-CM

## 2022-04-07 DIAGNOSIS — M67.912 ROTATOR CUFF DYSFUNCTION, LEFT: ICD-10-CM

## 2022-04-07 DIAGNOSIS — M15.8 OTHER OSTEOARTHRITIS INVOLVING MULTIPLE JOINTS: ICD-10-CM

## 2022-04-07 DIAGNOSIS — Z11.1 SCREENING-PULMONARY TB: ICD-10-CM

## 2022-04-07 DIAGNOSIS — Z79.899 HIGH RISK MEDICATION USE: ICD-10-CM

## 2022-04-07 DIAGNOSIS — Z79.52 LONG TERM CURRENT USE OF SYSTEMIC STEROIDS: ICD-10-CM

## 2022-04-07 DIAGNOSIS — M05.79 RHEUMATOID ARTHRITIS INVOLVING MULTIPLE SITES WITH POSITIVE RHEUMATOID FACTOR: Primary | ICD-10-CM

## 2022-04-07 PROCEDURE — 99214 PR OFFICE/OUTPT VISIT, EST, LEVL IV, 30-39 MIN: ICD-10-PCS | Mod: S$GLB,,, | Performed by: INTERNAL MEDICINE

## 2022-04-07 PROCEDURE — 99214 OFFICE O/P EST MOD 30 MIN: CPT | Mod: S$GLB,,, | Performed by: INTERNAL MEDICINE

## 2022-04-07 RX ORDER — FOLIC ACID 1 MG/1
1 TABLET ORAL DAILY
Qty: 30 TABLET | Refills: 6 | Status: SHIPPED | OUTPATIENT
Start: 2022-04-07 | End: 2022-07-18 | Stop reason: SDUPTHER

## 2022-04-07 RX ORDER — HYDROXYCHLOROQUINE SULFATE 200 MG/1
200 TABLET, FILM COATED ORAL DAILY
Qty: 30 TABLET | Refills: 4 | Status: SHIPPED | OUTPATIENT
Start: 2022-04-07 | End: 2022-07-18 | Stop reason: SDUPTHER

## 2022-04-07 RX ORDER — PNV NO.95/FERROUS FUM/FOLIC AC 28MG-0.8MG
1 TABLET ORAL 2 TIMES DAILY
Qty: 60 EACH | Refills: 4 | Status: SHIPPED | OUTPATIENT
Start: 2022-04-07 | End: 2022-07-18 | Stop reason: SDUPTHER

## 2022-04-07 RX ORDER — METHOTREXATE 2.5 MG/1
20 TABLET ORAL
Qty: 32 TABLET | Refills: 4 | Status: SHIPPED | OUTPATIENT
Start: 2022-04-07 | End: 2022-06-14 | Stop reason: SDUPTHER

## 2022-04-07 NOTE — PATIENT INSTRUCTIONS
Hydroxychloroquine (Plaquenil) is considered a disease-modifying anti-rheumatic drug (DMARD). It can decrease the pain and swelling of arthritis. It may prevent joint damage and reduce the risk of long-term disability. Hydroxychloroquine is in a class of medications that was first used to prevent and treat malaria. Today, it is used to treat rheumatoid arthritis, some symptoms of lupus, childhood arthritis (or juvenile idiopathic arthritis) and other autoimmune diseases.     How to Take It  Hydroxychloroquine comes in an oral tablet. Adult dosing ranges from 200 mg or 400 mg per day (6.5mg/kg). In some cases, higher doses can be used. It is recommended one tablet twice daily if taking more than one tablet. It is recommended to be taken with food. Symptoms can start to improve in one to two months, but it may take up to six months before the full benefits of this medication are experienced.    Hydroxychloroquine typically is very well tolerated. Serious side effects are rare. The most common side effects are nausea and diarrhea, which often improve with time. Less common side effects include rash, changes in skin pigment (such as darkening or dark spots), hair changes    In rare cases, hydroxychloroquine can cause visual changes or loss of vision. Such vision problems are more likely to occur in individuals taking high doses for many years, individuals 60 years or older, or those with significant kidney disease. At the recommended dose, development of visual problems due to the medication is rare. It is recommended that you have an eye exam within the first year of use, then repeat every one to five years based on current guidelines.

## 2022-06-11 DIAGNOSIS — Z79.899 HIGH RISK MEDICATION USE: ICD-10-CM

## 2022-06-11 DIAGNOSIS — M05.79 RHEUMATOID ARTHRITIS INVOLVING MULTIPLE SITES WITH POSITIVE RHEUMATOID FACTOR: ICD-10-CM

## 2022-06-11 RX ORDER — METHOTREXATE 2.5 MG/1
20 TABLET ORAL
Qty: 32 TABLET | Refills: 4 | Status: CANCELLED | OUTPATIENT
Start: 2022-06-11

## 2022-06-13 DIAGNOSIS — Z79.899 HIGH RISK MEDICATION USE: ICD-10-CM

## 2022-06-13 DIAGNOSIS — M05.79 RHEUMATOID ARTHRITIS INVOLVING MULTIPLE SITES WITH POSITIVE RHEUMATOID FACTOR: ICD-10-CM

## 2022-06-13 RX ORDER — METHOTREXATE 2.5 MG/1
20 TABLET ORAL
Qty: 32 TABLET | Refills: 4 | Status: CANCELLED | OUTPATIENT
Start: 2022-06-13

## 2022-06-14 ENCOUNTER — PATIENT MESSAGE (OUTPATIENT)
Dept: RHEUMATOLOGY | Facility: CLINIC | Age: 72
End: 2022-06-14
Payer: MEDICARE

## 2022-06-14 DIAGNOSIS — M05.79 RHEUMATOID ARTHRITIS INVOLVING MULTIPLE SITES WITH POSITIVE RHEUMATOID FACTOR: ICD-10-CM

## 2022-06-14 DIAGNOSIS — Z79.899 HIGH RISK MEDICATION USE: ICD-10-CM

## 2022-06-14 RX ORDER — METHOTREXATE 2.5 MG/1
20 TABLET ORAL
Qty: 32 TABLET | Refills: 4 | Status: SHIPPED | OUTPATIENT
Start: 2022-06-14 | End: 2022-07-18 | Stop reason: SDUPTHER

## 2022-07-05 LAB
ALBUMIN SERPL BCP-MCNC: 4.3 G/DL (ref 3.4–5)
ALBUMIN/GLOBULIN RATIO: 1.23 RATIO (ref 1.1–1.8)
ALP SERPL-CCNC: 82 U/L (ref 46–116)
ALT SERPL W P-5'-P-CCNC: 60 U/L (ref 12–78)
ANION GAP SERPL CALC-SCNC: 4 MMOL/L (ref 3–11)
AST SERPL-CCNC: 45 U/L (ref 15–37)
BASOPHILS NFR BLD: 1.5 % (ref 0–3)
BILIRUB SERPL-MCNC: 0.5 MG/DL (ref 0–1)
BUN SERPL-MCNC: 12 MG/DL (ref 7–18)
BUN/CREAT SERPL: 16.43 RATIO (ref 7–18)
CALCIUM SERPL-MCNC: 9 MG/DL (ref 8.8–10.5)
CHLORIDE SERPL-SCNC: 102 MMOL/L (ref 100–108)
CO2 SERPL-SCNC: 31 MMOL/L (ref 21–32)
CREAT SERPL-MCNC: 0.73 MG/DL (ref 0.55–1.02)
CREATINE KINASE, SERUM: 41 U/L (ref 26–308)
CRP QUANTITATIVE: < 0.2 MG/DL (ref 0–0.9)
EOSINOPHIL NFR BLD: 2.9 % (ref 1–3)
ERYTHROCYTE [DISTWIDTH] IN BLOOD BY AUTOMATED COUNT: 15.9 % (ref 12.5–18)
ERYTHROCYTE [SEDIMENTATION RATE] IN BLOOD: 13 MM/HR (ref 0–20)
GFR ESTIMATION: > 60
GLOBULIN: 3.5 G/DL (ref 2.3–3.5)
GLUCOSE SERPL-MCNC: 90 MG/DL (ref 70–110)
HCT VFR BLD AUTO: 42.5 % (ref 37–47)
HGB BLD-MCNC: 13.5 G/DL (ref 12–16)
LYMPHOCYTES NFR BLD: 36.8 % (ref 25–40)
MACROCYTES BLD QL SMEAR: NORMAL
MCH RBC QN AUTO: 31.8 PG (ref 27–31.2)
MCHC RBC AUTO-ENTMCNC: 31.8 G/DL (ref 31.8–35.4)
MCV RBC AUTO: 100.2 FL (ref 80–97)
MONOCYTES NFR BLD: 6.8 % (ref 1–15)
NEUTROPHILS # BLD AUTO: 3.17 10*3/UL (ref 1.8–7.7)
NEUTROPHILS NFR BLD: 51.7 % (ref 37–80)
NUCLEATED RED BLOOD CELLS: 0 %
PLATELETS: 291 10*3/UL (ref 142–424)
POTASSIUM SERPL-SCNC: 4 MMOL/L (ref 3.6–5.2)
PROT SERPL-MCNC: 7.8 G/DL (ref 6.4–8.2)
RBC # BLD AUTO: 4.24 10*6/UL (ref 4.2–5.4)
SODIUM BLD-SCNC: 137 MMOL/L (ref 135–145)
WBC # BLD: 6.1 10*3/UL (ref 4.6–10.2)

## 2022-07-05 NOTE — PROGRESS NOTES
Subjective:      Patient ID: Alysa Abernathy is a 71 y.o. female.    Chief Complaint: Disease Management    HPI:   Includes joint pain without subjective swelling.   Antecedent event includes: None;  Pain location includes: hands, ankles and feet;  Gradual onset; beginning >years ago;  Constant ache, Mild in severity,   Lasting >minutes, Worse during the daytime;   Improved with rest, medication, change in position and none;   Worsened with activity and overuse;     Review of Systems   Constitutional: Negative for fever and unexpected weight change.   HENT: Negative for mouth sores and trouble swallowing.    Eyes: Positive for redness and eye dryness.   Respiratory: Negative for cough and shortness of breath.    Cardiovascular: Negative for chest pain.   Gastrointestinal: Negative for constipation and diarrhea.   Genitourinary: Negative for dysuria and genital sores.   Musculoskeletal: Positive for arthralgias. Negative for joint swelling.   Integumentary:  Negative for rash.   Neurological: Positive for headaches.   Hematological: Bruises/bleeds easily.      Past Medical History:   Diagnosis Date    Arthritis     Cataract     Dry mouth     Thyroid disease      Past Surgical History:   Procedure Laterality Date    EYE SURGERY      HYSTERECTOMY      TONSILLECTOMY        See the Assessment/Plan for further characterization of the HPI, ROS, Medical, Surgical, Family, and Social Histories including Tobacco use, Meds; all of which has been reviewed in Epic.    Medication List with Changes/Refills   Current Medications    ALBUTEROL (PROVENTIL) 5 MG/ML NEBULIZER SOLUTION    Take 2.5 mg by nebulization every 6 (six) hours as needed for Wheezing. Rescue    LEVOTHYROXINE (SYNTHROID) 75 MCG TABLET    Take 75 mcg by mouth once daily.    PREDNISONE (DELTASONE) 10 MG TABLET    Take 1 tablet (10 mg total) by mouth daily as needed (pain with swelling).   Changed and/or Refilled Medications    Modified Medication Previous  "Medication    CALCIUM CARBONATE-VITAMIN D3 (CALCIUM 500 + D) 500 MG-10 MCG (400 UNIT) TAB calcium carbonate-vitamin D3 (CALCIUM 500 + D) 500 mg-10 mcg (400 unit) Tab       Take 1 tablet by mouth 2 (two) times a day.    Take 1 tablet by mouth 2 (two) times a day.    FOLIC ACID (FOLVITE) 1 MG TABLET folic acid (FOLVITE) 1 MG tablet       Take 1 tablet (1 mg total) by mouth once daily.    Take 1 tablet (1 mg total) by mouth once daily.    HYDROXYCHLOROQUINE (PLAQUENIL) 200 MG TABLET hydrOXYchloroQUINE (PLAQUENIL) 200 mg tablet       Take 1 tablet (200 mg total) by mouth once daily.    Take 1 tablet (200 mg total) by mouth once daily.    METHOTREXATE 2.5 MG TAB methotrexate 2.5 MG Tab       Take 8 tablets (20 mg total) by mouth every 7 days.    Take 8 tablets (20 mg total) by mouth every 7 days.         Objective:   /77   Pulse 77   Resp 15   Ht 5' 7" (1.702 m)   Wt 67.1 kg (148 lb)   SpO2 98%   BMI 23.18 kg/m²   Physical Exam  Non-toxic appearance. No distress.   Normocephalic and atraumatic.   External ears normal.    Conjunctivae and EOM are normal. No scleral icterus.   RRR, No friction rub; palpable distal pulses.    No tachypnea or signs of respiratory distress.   Abdominal: No guarding or rebound tenderness.   Neurological: Oriented. Normal thought content. No cranial nerve deficit. Strength is grossly normal without focal deficit.  Skin is warm. No pallor.   Musculoskeletal: DJD. see below for further input. No overt synovitis.     X-Ray Chest PA And Lateral                                RADIOLOGY REPORT        PT NAME: IBAN HERNANDEZ      [facility]     : 1950 F 71             [address]    ACCT: [id number]                                              [facility] Rec #: [id number]                                        [id number]    Patient Location: [facility].RAD/             Procedure: CHEST 2 VIEWS    REQUISITION #: [id number]      REPORT #: [id number]         "   DATE OF EXAM: 07/14/22    TIME OF EXAM:        CHEST 2 VIEWS        HISTORY: Interstitial pulmonary disease        COMPARISON: CT chest from 2/11/2022        FINDINGS: Cardiomediastinal silhouette appears within normal limits. The   pulmonary vascularity is normal. Generalized interstitial thickening. No   airspace infiltrates or pleural effusions. Osteopenia. DJD. Moderate   compression deformity of T11.         There is no pneumothorax or free air.        IMPRESSION: Chronic interstitial changes.                DICTATING PHYSICIAN:  NAVI HORAN MD                   Date Dictated: 07/14/22 1101        Signed By:  NAVI HORAN MD <Electronically signed by NAVI HORAN MD in OV>    Date Signed:  07/14/22 1101     CC: FELICIA ANDERSON MD ; FELICIA ANDERSON MD      ADMITTING PHYSICIAN:                                                                                                    ORDERING PHY: FELICIA ANDERSON MD                                                                                                                                                      ATTENDING PHY: FELICIA ANDERSON MD    Patient Status:  REG CLI    Admit Service Date: 07/14/22         Orders Only on 07/05/2022   Component Date Value Ref Range Status    QuantiFERON-TB Gold Plus 07/05/2022 NEGATIVE  Negative Final    Comment: Interpretive Data: Quantiferon TB Gold PlusInterferon gamma release is measured for specimens from each ofthe four collection tubes. A qualitative result (Negative,Positive, or Indeterminate) is based on interpretation of thefour values, NIL, MITOGEN   minus NIL (MITOGEN-NIL), TB1 minus NIL(TB1-NIL), and TB2 minus NIL (TB2-NIL). The NIL value representsnonspecific reactivity produced by the patient specimen. TheMITOGEN-NIL value serves as the positive control for the patientspecimen, demonstrating   successful lymphocyte activity. TheTB1-NIL tube specifically detects CD4+ lymphocyte  reactivity,specifically stimulated by the TB1 antigens. The TB2-NIL tubedetects both CD4+ and CD8+ lymphocyte reactivity, stimulated byTB2 antigens. An overall Negative   result does not completelyrule out TB infection.A false-positive result in the absence of other clinicalevidence of TB infection is not uncommon. Refer to: UpdatedGuidelines for Using Interferon Gamma Release Assays to DetectMycobacterium                            tuberculosis   Infection --- [place], 2010(http://www.cdc.gov/mmwr/preview/mmwrhtml/er1750n7.htm), formore information concerning test performance in low-prevalencepopulations and use in occupational screening.      QuantiFERON TB1 NIL 07/05/2022 0.01  0.00 - 0.34 IU/mL Final    QuantiFERON TB2 NIL 07/05/2022 0.01  0.00 - 0.34 IU/mL Final    QuantiFERON Mitogen Minus NIL 07/05/2022 7.93  IU/mL Final    QuantiFERON TB NIL 07/05/2022 0.01  IU/mL Final    Performed By: [id number] [place] [id number] Director: Ken Dash MD, PhD   Orders Only on 07/05/2022   Component Date Value Ref Range Status    WBC 07/05/2022 6.1  4.6 - 10.2 10*3/uL Final    RBC 07/05/2022 4.24  4.2 - 5.4 10*6/uL Final    Hemoglobin 07/05/2022 13.5  12.0 - 16.0 g/dL Final    Hematocrit 07/05/2022 42.5  37.0 - 47.0 % Final    MCV 07/05/2022 100.2 (A) 80 - 97 fL Final    MCH 07/05/2022 31.8 (A) 27.0 - 31.2 pg Final    MCHC 07/05/2022 31.8  31.8 - 35.4 g/dL Final    RDW RBC Auto-Rto 07/05/2022 15.9  12.5 - 18.0 % Final    Platelets 07/05/2022 291  142 - 424 10*3/uL Final    Neutrophils 07/05/2022 51.7  37 - 80 % Final    Lymphocytes 07/05/2022 36.8  25 - 40 % Final    Monocytes 07/05/2022 6.8  1 - 15 % Final    Eosinophils 07/05/2022 2.9  1 - 3 % Final    Basophils 07/05/2022 1.5  0 - 3 % Final    nRBC# 07/05/2022 0.0  % Final    Neutrophils Absolute 07/05/2022 3.17  1.8 - 7.7 10*3/uL Final    Macrocytes 07/05/2022 1+   Final    Sed Rate 07/05/2022 13   0 - 20 mm/hr Final   Orders Only on 07/05/2022   Component Date Value Ref Range Status    Sodium 07/05/2022 137  135 - 145 mmol/L Final    Potassium 07/05/2022 4.0  3.6 - 5.2 mmol/L Final    Chloride 07/05/2022 102  100 - 108 mmol/L Final    CO2 07/05/2022 31  21 - 32 mmol/L Final    Anion Gap 07/05/2022 4.0  3.0 - 11.0 mmol/L Final    BUN 07/05/2022 12  7 - 18 mg/dL Final    Creatinine 07/05/2022 0.73  0.55 - 1.02 mg/dL Final    GFR ESTIMATION 07/05/2022 > 60  >60 Final               DESCRIPTION       GLOMERULAR FILTRATION RATE             NORMAL                     >60             KIDNEY  DISEASE           15-60             KIDNEY FAILURE             <15    BUN/Creatinine Ratio 07/05/2022 16.43  7 - 18 Ratio Final    Glucose 07/05/2022 90  70 - 110 mg/dL Final    Calcium 07/05/2022 9.0  8.8 - 10.5 mg/dL Final    Total Bilirubin 07/05/2022 0.5  0.0 - 1.0 mg/dL Final    AST 07/05/2022 45 (A) 15 - 37 U/L Final    ALT 07/05/2022 60  12 - 78 U/L Final    Total Protein 07/05/2022 7.8  6.4 - 8.2 g/dL Final    Albumin 07/05/2022 4.3  3.4 - 5.0 g/dL Final    Globulin 07/05/2022 3.5  2.3 - 3.5 g/dL Final    Albumin/Globulin Ratio 07/05/2022 1.228  1.1 - 1.8 Ratio Final    Alkaline Phosphatase 07/05/2022 82  46 - 116 U/L Final    CK, Serum 07/05/2022 41  26 - 308 U/L Final    CRP QUANTITATIVE 07/05/2022 < 0.2  0.0 - 0.9 mg/dL Final   Orders Only on 03/21/2022   Component Date Value Ref Range Status    Sodium 03/21/2022 142  135 - 145 mmol/L Final    Potassium 03/21/2022 4.1  3.6 - 5.2 mmol/L Final    Chloride 03/21/2022 107  100 - 108 mmol/L Final    CO2 03/21/2022 26  21 - 32 mmol/L Final    Anion Gap 03/21/2022 9.0  3.0 - 11.0 mmol/L Final    BUN 03/21/2022 12  7 - 18 mg/dL Final    Creatinine 03/21/2022 0.79  0.55 - 1.02 mg/dL Final    GFR ESTIMATION 03/21/2022 > 60  >60 Final               DESCRIPTION       GLOMERULAR FILTRATION RATE             NORMAL                     >60              KIDNEY  DISEASE           15-60             KIDNEY FAILURE             <15    BUN/Creatinine Ratio 03/21/2022 15.18  7 - 18 Ratio Final    Glucose 03/21/2022 96  70 - 110 mg/dL Final    Calcium 03/21/2022 8.5 (A) 8.8 - 10.5 mg/dL Final    Total Bilirubin 03/21/2022 0.5  0.0 - 1.0 mg/dL Final    AST 03/21/2022 23  15 - 37 U/L Final    ALT 03/21/2022 29  12 - 78 U/L Final    Total Protein 03/21/2022 6.5  6.4 - 8.2 g/dL Final    Albumin 03/21/2022 3.8  3.4 - 5.0 g/dL Final    Globulin 03/21/2022 2.7  2.3 - 3.5 g/dL Final    Albumin/Globulin Ratio 03/21/2022 1.407  1.1 - 1.8 Ratio Final    Alkaline Phosphatase 03/21/2022 88  46 - 116 U/L Final    CK, Serum 03/21/2022 42  26 - 308 U/L Final    CRP QUANTITATIVE 03/21/2022 < 0.2  0.0 - 0.9 mg/dL Final   Orders Only on 03/21/2022   Component Date Value Ref Range Status    WBC 03/21/2022 4.5 (A) 4.6 - 10.2 10*3/uL Final    RBC 03/21/2022 3.96 (A) 4.2 - 5.4 10*6/uL Final    Hemoglobin 03/21/2022 12.3  12.0 - 16.0 g/dL Final    Hematocrit 03/21/2022 39.2  37.0 - 47.0 % Final    MCV 03/21/2022 99.0 (A) 80 - 97 fL Final    MCH 03/21/2022 31.1  27.0 - 31.2 pg Final    MCHC 03/21/2022 31.4 (A) 31.8 - 35.4 g/dL Final    RDW RBC Auto-Rto 03/21/2022 13.8  12.5 - 18.0 % Final    Platelets 03/21/2022 266  142 - 424 10*3/uL Final    Neutrophils 03/21/2022 44.4  37 - 80 % Final    Lymphocytes 03/21/2022 40.4 (A) 25 - 40 % Final    Monocytes 03/21/2022 7.9  1 - 15 % Final    Eosinophils 03/21/2022 6.0 (A) 1 - 3 % Final    Basophils 03/21/2022 1.3  0 - 3 % Final    nRBC# 03/21/2022 0.0  % Final    Neutrophils Absolute 03/21/2022 2.01  1.8 - 7.7 10*3/uL Final    Sed Rate 03/21/2022 19  0 - 20 mm/hr Final   Orders Only on 12/13/2021   Component Date Value Ref Range Status    Sodium 12/13/2021 139  135 - 145 mmol/L Final    Potassium 12/13/2021 4.4  3.6 - 5.2 mmol/L Final    Chloride 12/13/2021 102  100 - 108 mmol/L Final    CO2 12/13/2021 29  21 - 32 mmol/L  Final    Anion Gap 12/13/2021 8.0  3.0 - 11.0 mmol/L Final    BUN 12/13/2021 13  7 - 18 mg/dL Final    Creatinine 12/13/2021 0.73  0.55 - 1.02 mg/dL Final    GFR ESTIMATION 12/13/2021 > 60  >60 Final               DESCRIPTION       GLOMERULAR FILTRATION RATE             NORMAL                     >60             KIDNEY  DISEASE           15-60             KIDNEY FAILURE             <15    BUN/Creatinine Ratio 12/13/2021 17.80  7 - 18 Ratio Final    Glucose 12/13/2021 98  70 - 110 mg/dL Final    Calcium 12/13/2021 9.0  8.8 - 10.5 mg/dL Final    Total Bilirubin 12/13/2021 0.6  0.0 - 1.0 mg/dL Final    AST 12/13/2021 26  15 - 37 U/L Final    ALT 12/13/2021 35  12 - 78 U/L Final    Total Protein 12/13/2021 6.6  6.4 - 8.2 g/dL Final    Albumin 12/13/2021 3.9  3.4 - 5.0 g/dL Final    Globulin 12/13/2021 2.7  2.3 - 3.5 g/dL Final    Albumin/Globulin Ratio 12/13/2021 1.444  1.1 - 1.8 Ratio Final    Alkaline Phosphatase 12/13/2021 76  46 - 116 U/L Final    CK, Serum 12/13/2021 35  26 - 308 U/L Final    CRP QUANTITATIVE 12/13/2021 < 0.2  0.0 - 0.9 mg/dL Final   Orders Only on 12/13/2021   Component Date Value Ref Range Status    WBC 12/13/2021 8.6  4.6 - 10.2 10*3/uL Final    RBC 12/13/2021 3.82 (A) 4.2 - 5.4 10*6/uL Final    Hemoglobin 12/13/2021 12.3  12.0 - 16.0 g/dL Final    Hematocrit 12/13/2021 39.7  37.0 - 47.0 % Final    MCV 12/13/2021 103.9 (A) 80 - 97 fL Final    MCH 12/13/2021 32.2 (A) 27.0 - 31.2 pg Final    MCHC 12/13/2021 31.0 (A) 31.8 - 35.4 g/dL Final    RDW RBC Auto-Rto 12/13/2021 14.9  12.5 - 18.0 % Final    Platelets 12/13/2021 299  142 - 424 10*3/uL Final    Neutrophils 12/13/2021 69.4  37 - 80 % Final    Lymphocytes 12/13/2021 23.0 (A) 25 - 40 % Final    Monocytes 12/13/2021 4.3  1 - 15 % Final    Eosinophils 12/13/2021 2.0  1 - 3 % Final    Basophils 12/13/2021 0.7  0 - 3 % Final    nRBC# 12/13/2021 0.0  % Final    Neutrophils Absolute 12/13/2021 5.95  1.8 - 7.7 10*3/uL  Final    Macrocytes 12/13/2021 1+   Final    Sed Rate 12/13/2021 20  0 - 20 mm/hr Final   Orders Only on 08/16/2021   Component Date Value Ref Range Status    Hypochromia 08/16/2021 1+   Final    WBC 08/16/2021 8.7  4.6 - 10.2 10*3/uL Final    RBC 08/16/2021 4.07 (A) 4.2 - 5.4 10*6/uL Final    Hemoglobin 08/16/2021 12.0  12.0 - 16.0 g/dL Final    Hematocrit 08/16/2021 39.0  37.0 - 47.0 % Final    MCV 08/16/2021 95.8  80 - 97 fL Final    MCH 08/16/2021 29.5  27.0 - 31.2 pg Final    MCHC 08/16/2021 30.8 (A) 31.8 - 35.4 g/dL Final    RDW RBC Auto-Rto 08/16/2021 16.4  12.5 - 18.0 % Final    Platelets 08/16/2021 322  142 - 424 10*3/uL Final    Neutrophils 08/16/2021 55.9  37 - 80 % Final    Lymphocytes 08/16/2021 32.9  25 - 40 % Final    Monocytes 08/16/2021 5.8  1 - 15 % Final    Eosinophils 08/16/2021 3.8 (A) 1 - 3 % Final    Basophils 08/16/2021 1.0  0 - 3 % Final    nRBC# 08/16/2021 0.0  % Final    Neutrophils Absolute 08/16/2021 4.86  1.8 - 7.7 10*3/uL Final    Sed Rate 08/16/2021 45 (A) 0 - 20 mm/hr Final   Orders Only on 08/16/2021   Component Date Value Ref Range Status    CRP QUANTITATIVE 08/16/2021 0.5  0.0 - 0.9 mg/dL Final    Sodium 08/16/2021 141  135 - 145 mmol/L Final    Potassium 08/16/2021 4.3  3.6 - 5.2 mmol/L Final    Chloride 08/16/2021 105  100 - 108 mmol/L Final    CO2 08/16/2021 30  21 - 32 mmol/L Final    Anion Gap 08/16/2021 6.0  3.0 - 11.0 mmol/L Final    BUN 08/16/2021 16  7 - 18 mg/dL Final    Creatinine 08/16/2021 0.72  0.55 - 1.02 mg/dL Final    GFR ESTIMATION 08/16/2021 > 60  >60 Final               DESCRIPTION       GLOMERULAR FILTRATION RATE             NORMAL                     >60             KIDNEY  DISEASE           15-60             KIDNEY FAILURE             <15    BUN/Creatinine Ratio 08/16/2021 22.22 (A) 7 - 18 Ratio Final    Glucose 08/16/2021 91  70 - 110 mg/dL Final    Calcium 08/16/2021 8.8  8.8 - 10.5 mg/dL Final    Total Bilirubin 08/16/2021  0.5  0.0 - 1.0 mg/dL Final    AST 08/16/2021 17  15 - 37 U/L Final    ALT 08/16/2021 22  12 - 78 U/L Final    Total Protein 08/16/2021 6.9  6.4 - 8.2 g/dL Final    Albumin 08/16/2021 3.6  3.4 - 5.0 g/dL Final    Globulin 08/16/2021 3.3  2.3 - 3.5 g/dL Final    Albumin/Globulin Ratio 08/16/2021 1.090 (A) 1.1 - 1.8 Ratio Final    Alkaline Phosphatase 08/16/2021 80  46 - 116 U/L Final    CK, Serum 08/16/2021 24 (A) 26 - 308 U/L Final        All of the data above and below has been reviewed by myself and any further interpretations will be reflected in the assessment and plan.   The data includes review of external notes, and independent interpretation of lab results, x-rays, and imaging reports.  Active inflammatory arthritis is an illness that poses a threat to bodily function and even a threat to life in some cases.  Drug therapy to treat inflammatory arthritis usually requires intensive monitoring for toxicity.    Review of patient's allergies indicates:  No Known Allergies  Assessment/Plan:       Visit prior to Visit prior to Visit prior to Visit prior to last visit:     lab 4/14/21:  SPEP alpha 2 slight high  Serum MARIANNA normal  Ig level normal   HLAB27 neg  TB neg  PRUDENCE neg  +  C3 161  C4 31  Creat 0.6; alb 4.2;  CCP not done  TSH 0.2 low T4 2  CPK 29  WBC 8.2; Hgb 12.3 MCV 94; plt 302  ESR 68; CRP 2.715     records    4/26/21: Rheumatoid arthritis, Dupuytrens, trigger fingers: tendon sheath and finger injections   1/16/20, 3/20/20; 5/21/20,6/30/20, 9/29/20, 12/8/20; 2/23/21: each visit injections in hand joints and or wrist elbow, dequervains etc.     12/18/20: CT chest with: mild ILD predominantly subpleural lower lungs, small hiatal hernia  7/25/18 MRI left shoulder: partial tears supra/infraspinatus tendons; moderate tendinosis long head biceps tendon; glenohumeral and AC DJD; changes suggestive adhesive capsulitis     Moderate to severe disease activity  Mild to moderate deformities  Mild to  Moderately impaired functional status  Fair prognosis     She failed Methotrexate per history but also stopped and felt not helping     Infliximab 3mg/kg 0,2,6 then q 8wks, hold if infection     Resume MTX 7.5mg PO weekly  Folic acid 1mg daily     Resume prednisone 10mg qam prn  We will retry to seek records Dr. Lino but sounds like only one tele visit)     Visit prior to Visit prior to Last visit:     ESR 21; CRP normal     Prior plan for   Infliximab 3mg/kg 0,2,6 then q 8wks, hold if infection but  She rather never got the infusion and didn't think to call us     MTX 7.5mg PO weekly  Folic acid 1mg daily  Prior plan Resume prednisone 10mg qam prn     No longer using celebrex 200mg daily prn as she ran out and we will refill     She is currently on antibiotic from PCP for a cough and should hold the Methotrexate until symptoms resolve     No overt synovitis but she is on prednisone 10mg and the MTX  No overt pneumonia on exam but does have some diminished breath sounds and crackles in base;   her lung exam is unchanged from previous     She has been reluctant to see Pulmonary for the Interstitial Lung disease   as she wanted to work on her rheumatoid arthritis first and   then see Pulmonary which   I explained she should rather do both that will optimize her outcome;   As she needs regular monitoring CT chest PFT's exam etc;    Otherwise it may be confusing if she has symptoms of infection and will potentially put a hold on her treatment for the rheumatoid.     We will seek records from PCP and they will try to keep us in the loop moving forward  We will send today's note to PCP      Dara will look into getting the infusion set up; the orders were written and sent; maybe they had trouble getting in touch with her or other?     Infliximab 3mg/kg 0,2,6 then q 8wks, hold if infection     Hold Methotrexate for now until symptoms of infection resolves     Resume Celebrex 200mg daily prn     Contact PCP today to see  about seeing/getting reestablished with Pulmonary for monitoring;  She should also see Dermatology for regular skin exam given the Actinic sun damage scar including forearms    She will plan to contact us for any problem or question including running out of meds or not getting      Visit prior to Visit prior to Last visit:     ESR 45 CRP<0.3     Infliximab 3mg/kg 0,2,6 then q 8wks, hold if infection     Prior plan Hold Methotrexate for now until symptoms of infection resolves  MTX 7.5mg PO weekly  Folic acid 1mg daily     Celebrex 200mg daily prn     She has been doing much better     She had 2 of the infusions     Some pain weakness in wrists and ankles     She is still using prednisone 10mg qam and she is some leery of decreasing as she is doing so well     She hurt her low back lifting something and went to urgent care and has improved     She uses ibuprofen instead of celebrex but still has celebrex and can use either but not both at same time     No overt synovitis  Appears mostly in remission with mild to moderate deformity  Lungs sound better      Cont inflextra infusions     Increase Methotrexate 15mg PO weekly     Cont folic acid 1mg daily     Cont prednisone 10mg qam for 2 weeks then attempt to decrease to 5mg as tolerated per response;   If doing well on 5mg for weeks more she can attempt to decrease to every other day and/or stop altogether  If problem decreasing can resume prior dose and call us     Revisit 4 months with lab 2 weeks prior     Contact us prn     Visit prior to Last visit:     interim lab:            Lab Results   Component Value Date      12/13/2021     K 4.4 12/13/2021      12/13/2021     CO2 29 12/13/2021     ANIONGAP 8.0 12/13/2021     GLU 98 12/13/2021     CALCIUM 9.0 12/13/2021     BUN 13 12/13/2021     CREATININE 0.73 12/13/2021     PROT 6.6 12/13/2021     AST 26 12/13/2021     ALT 35 12/13/2021     BILITOT 0.6 12/13/2021     ALKPHOS 76 12/13/2021     WBC 8.6 12/13/2021      NEUTROPHILS 69.4 12/13/2021     HGB 12.3 12/13/2021     .9 (H) 12/13/2021     LABPLAT 299 12/13/2021     SEDRATE 20 12/13/2021     WBCUA 0-2 07/07/2021     RBCUA 0-2 07/07/2021     BACTERIA +/- Rare 07/07/2021     PROTEINUA Trace (A) 07/07/2021            ESR 20 CRP<0.2     Infliximab 3mg/kg 0,2,6 then q 8wks, hold if infection     Methotrexate 15mg PO weekly     Cont folic acid 1mg daily     Celebrex 200mg daily prn     Cont prednisone 10mg qam for 2 weeks then attempt to decrease to 5mg as tolerated per response;   If doing well on 5mg for weeks more she can attempt to decrease to every other day and/or stop altogether  If problem decreasing can resume prior dose and call us     She has some interim swelling in hands but she has been doing much better     Some interim vision problems and we will refer to Ophthalmology for further evaluation and also for plaquenil exam     She stopped celebrex as occasional ibuprofen is adequate but I think she is still using intermittent prednisone and I would like to if she can wean that first     She has been back to using prednisone 10mg which I went over tapering plan in future; down to 5mg for weeks and if doing well 5mg qod few weeks and if doing well stop; if not doing well go back to previous dose and contact us     She is more interested in seeing Pulmonary and we will refer and also get PFTs spirometry     No overt synovitis  Appears to be in remission but still needing prednisone  Lungs some crackles left base and some diminished breath sounds but her lung exam sounds better     Increase Methotrexate 20mg PO weekly  folic acid 1mg daily     Infliximab 3mg/kg 0,2,6 then q 8wks, hold if infection     Prednisone as per above     DEXA to further evaluate     Add Calcium D daily     we will refer to Ophthalmology for further evaluation of vision problems and also for plaquenil exam     We will refer to Pulmonary     PFTs spirometry     Revisit 3 months with lab 2  weeks prior     Contact us prn     Addendum 2/22/22: I received order to rewrite orders for her infusion today and I am going to increase the dose to 4mg/kg with the goal of getting her off prednisone in the future; We can always consider decreasing the dose in the future if doing well        Last visit:     Interim lab:        Lab Results   Component Value Date      03/21/2022     K 4.1 03/21/2022      03/21/2022     CO2 26 03/21/2022     ANIONGAP 9.0 03/21/2022     GLU 96 03/21/2022     CALCIUM 8.5 (L) 03/21/2022     BUN 12 03/21/2022     CREATININE 0.79 03/21/2022     PROT 6.5 03/21/2022     AST 23 03/21/2022     ALT 29 03/21/2022     BILITOT 0.5 03/21/2022     ALKPHOS 88 03/21/2022     WBC 4.5 (L) 03/21/2022     NEUTROPHILS 44.4 03/21/2022     HGB 12.3 03/21/2022     MCV 99.0 (H) 03/21/2022     LABPLAT 266 03/21/2022     SEDRATE 19 03/21/2022     WBCUA 0-2 07/07/2021     RBCUA 0-2 07/07/2021     BACTERIA +/- Rare 07/07/2021     PROTEINUA Trace (A) 07/07/2021         Ca 8.5     ESR 19 CRP<0.2        Interim 2/03/22 DEXA:    total lumbar T-score of -2.2.     right femoral neck T-score of 2.0.    right femur T-score of -2.3.    left femoral neck T-score of -1.9.    total left femur T-score of -2.0.             Interim 2022 PFTs spirometry: Spirometry data is acceptable and reproducible. Patient unable to reach plateau.  Quality grading A-C acceptable. Atleast two acceptable DLCO measurements  within 2 ml/min/mmHg of each other. No post bronchodilator test performed at  this time. Good patient effort.  1) Normal spirometry.  2) Lung volumes are normal.  3) Mildly reduced DLCO that corrects for alveolar volume.     She has been back to using prednisone 10mg which I went over tapering plan in future; down to 5mg for weeks and if doing well 5mg qod few weeks and if doing well stop; if not doing well go back to previous dose and contact us prev noted     Methotrexate 20mg PO weekly  folic acid 1mg  daily     Celebrex 200mg daily prn     Infliximab 4mg/kg q 8wks, hold if infection     Calcium D daily     She has been off of prednisone for months but she has been having a lot of pain in her hands     She also no longer uses celebrex;      uses otc ibuprofen instead that she prefers     She tells me Ophthalmology cleared her for the plaquenil     No overt synovitis but she is having a lot of pain     Verbal education and some written     Went over interim lab and studies     She will consider revisiting ortho for the hand pain     Add plaquenil 200mg daily     Cont other:  Methotrexate 20mg PO weekly  folic acid 1mg daily     Ibuprofen otc she prefers over celebrex     Infliximab 4mg/kg q 8wks, hold if infection     She should increase the Calcium D to bid every other day      She would rather not have further treatment for the osteopenia bordering osteoporosis     Revisit 2-3 months     Call if not improving or question      Contact us prn       This visit:    Interim lab:  Lab Results   Component Value Date     07/05/2022    K 4.0 07/05/2022     07/05/2022    CO2 31 07/05/2022    ANIONGAP 4.0 07/05/2022    GLU 90 07/05/2022    CALCIUM 9.0 07/05/2022    BUN 12 07/05/2022    CREATININE 0.73 07/05/2022    PROT 7.8 07/05/2022    AST 45 (H) 07/05/2022    ALT 60 07/05/2022    BILITOT 0.5 07/05/2022    ALKPHOS 82 07/05/2022    WBC 6.1 07/05/2022    NEUTROPHILS 51.7 07/05/2022    HGB 13.5 07/05/2022    .2 (H) 07/05/2022    LABPLAT 291 07/05/2022    SEDRATE 13 07/05/2022    WBCUA 0-2 07/07/2021    RBCUA 0-2 07/07/2021    BACTERIA +/- Rare 07/07/2021    PROTEINUA Trace (A) 07/07/2021           ESR 13 CRP<0.2    2022 TB negative    Prev noted: (She will consider revisiting ortho for the hand pain     Add plaquenil 200mg daily     Cont other:  Methotrexate 20mg PO weekly  folic acid 1mg daily     Ibuprofen otc she prefers over celebrex     Infliximab 4mg/kg q 8wks, hold if infection     She should  "increase the Calcium D to bid every other day      She would rather not have further treatment for the osteopenia bordering osteoporosis)    She has some interim pain in hands and feet but sounds like doing well    She tells me she is doing very well; rather mostly pain free    She did have some thumb injections with Orthopedics months ago that helped some    She is followed by Pulmonary and tells me that he told her her recent CXR looked good noted    She did see ENT who did not find problem with her vocal chords noted    I suspect her problem singing not as well as she use to is rather more related to the pulmonary disease    No overt synovitis  Bilateral crackles worse on left lower lung unchanged    Cont current  Refill meds      Revisit 4 months with lab 2 weeks prior    Contact us prn     + CCP 41.08+ PRUDENCE neg HLAB27 neg uric acid 5.2 HCV neg HepBsAg negsAb neg coreAb neg  Rheumatoid arthritis     2022 TB negative    initial visit noted:  (Pain and swelling including hands and feet  Initially working with Dr. Alvarez getting "11 injections" including finger joints wrist;   also was injected for trigger fingers  She had a lot of relief from the injections including in other painful areas of her body  Most recent injections were 3 weeks ago  She notes that her joint swelling and stiffness is not as bad today; attributes to steroids  Also getting oral prednisone and medrol going back to 5/21/19 per records from PCP Dr. Linn; also CCP41+ 5/22/20  No overt synovitis; DJD generalized including hands)     12/18/20: CT chest with: mild ILD predominantly subpleural lower lungs, small hiatal hernia  7/25/18 MRI left shoulder: partial tears spinatus tendons; moderate tendinosis long head biceps tendon; glenohumeral and AC DJD; changes suggestive adhesive capsulitis     2/03/22 DEXA:    total lumbar T-score of -2.2.     right femoral neck T-score of 2.0.    right femur T-score of -2.3.    left femoral neck T-score " "of -1.9.    total left femur T-score of -2.0.          2022 PFTs spirometry: Spirometry data is acceptable and reproducible. Patient unable to reach plateau.  Quality grading A-C acceptable. Atleast two acceptable DLCO measurements  within 2 ml/min/mmHg of each other. No post bronchodilator test performed at  this time. Good patient effort.  1) Normal spirometry.  2) Lung volumes are normal.  3) Mildly reduced DLCO that corrects for alveolar volume.     Prev noted:(She has been reluctant to see Pulmonary for the Interstitial Lung disease   as she wanted to work on her rheumatoid arthritis first and   then see Pulmonary which   I explained she should rather do both that will optimize her outcome;   As she needs regular monitoring CT chest PFT's exam etc;    Otherwise it may be confusing if she has symptoms of infection and will potentially put a hold on her treatment for the rheumatoid.)     2021 TB negative     Prior left shoulder injection     Had seen Dr. Lino and given MTX for 3 months that "did not help at all"  Has also been working  records    4/26/21: Rheumatoid arthritis, Dupuytrens, trigger fingers: tendon sheath and finger injections   1/16/20, 3/20/20; 5/21/20,6/30/20, 9/29/20, 12/8/20; 2/23/21: each visit injections in hand joints and or wrist elbow, dequervains etc.     Has been managing with     Prior mobic     Prior oral prednisone  Steroid injections as per records  We refilled celebrex 200mg daily prn prev noted      Prev and again noted: (Rosacea on face with some Moon facies  Actinic sun damage scar including forearms   she should have dermatology evaluation at her earliest convenience)     Review of medical records as stated above.  Lab +/- imaging and other ordered diagnostic studies to further evaluate.  See the orders associated with this note visit.  Medications as prescribed as tolerated.    Education including verbal and those noted in the patient instructions.  Revisit as scheduled " and call prn.    The following diagnoses influence medical decision making and/or need further workup including the orders listed below:    Rheumatoid arthritis involving multiple sites with positive rheumatoid factor  -     CBC Auto Differential; Future; Expected date: 10/31/2022  -     CK; Future; Expected date: 10/31/2022  -     Comprehensive Metabolic Panel; Future; Expected date: 10/31/2022  -     C-Reactive Protein; Future; Expected date: 10/31/2022  -     Sedimentation rate; Future; Expected date: 10/31/2022  -     Urinalysis; Future; Expected date: 10/31/2022  -     folic acid (FOLVITE) 1 MG tablet; Take 1 tablet (1 mg total) by mouth once daily.  Dispense: 30 tablet; Refill: 6  -     hydrOXYchloroQUINE (PLAQUENIL) 200 mg tablet; Take 1 tablet (200 mg total) by mouth once daily.  Dispense: 30 tablet; Refill: 4  -     methotrexate 2.5 MG Tab; Take 8 tablets (20 mg total) by mouth every 7 days.  Dispense: 32 tablet; Refill: 4    Interstitial lung disease  -     CBC Auto Differential; Future; Expected date: 10/31/2022  -     CK; Future; Expected date: 10/31/2022  -     Comprehensive Metabolic Panel; Future; Expected date: 10/31/2022  -     C-Reactive Protein; Future; Expected date: 10/31/2022  -     Sedimentation rate; Future; Expected date: 10/31/2022  -     Urinalysis; Future; Expected date: 10/31/2022    Other osteoarthritis involving multiple joints  -     CBC Auto Differential; Future; Expected date: 10/31/2022  -     CK; Future; Expected date: 10/31/2022  -     Comprehensive Metabolic Panel; Future; Expected date: 10/31/2022  -     C-Reactive Protein; Future; Expected date: 10/31/2022  -     Sedimentation rate; Future; Expected date: 10/31/2022  -     Urinalysis; Future; Expected date: 10/31/2022    High risk medication use  -     CBC Auto Differential; Future; Expected date: 10/31/2022  -     CK; Future; Expected date: 10/31/2022  -     Comprehensive Metabolic Panel; Future; Expected date: 10/31/2022  -      C-Reactive Protein; Future; Expected date: 10/31/2022  -     Sedimentation rate; Future; Expected date: 10/31/2022  -     Urinalysis; Future; Expected date: 10/31/2022  -     folic acid (FOLVITE) 1 MG tablet; Take 1 tablet (1 mg total) by mouth once daily.  Dispense: 30 tablet; Refill: 6  -     methotrexate 2.5 MG Tab; Take 8 tablets (20 mg total) by mouth every 7 days.  Dispense: 32 tablet; Refill: 4    Rotator cuff dysfunction, left  -     CBC Auto Differential; Future; Expected date: 10/31/2022  -     CK; Future; Expected date: 10/31/2022  -     Comprehensive Metabolic Panel; Future; Expected date: 10/31/2022  -     C-Reactive Protein; Future; Expected date: 10/31/2022  -     Sedimentation rate; Future; Expected date: 10/31/2022  -     Urinalysis; Future; Expected date: 10/31/2022    Osteopenia of multiple sites  -     CBC Auto Differential; Future; Expected date: 10/31/2022  -     CK; Future; Expected date: 10/31/2022  -     Comprehensive Metabolic Panel; Future; Expected date: 10/31/2022  -     C-Reactive Protein; Future; Expected date: 10/31/2022  -     Sedimentation rate; Future; Expected date: 10/31/2022  -     Urinalysis; Future; Expected date: 10/31/2022  -     calcium carbonate-vitamin D3 (CALCIUM 500 + D) 500 mg-10 mcg (400 unit) Tab; Take 1 tablet by mouth 2 (two) times a day.  Dispense: 60 each; Refill: 4    Medication monitoring encounter  -     CBC Auto Differential; Future; Expected date: 10/31/2022  -     CK; Future; Expected date: 10/31/2022  -     Comprehensive Metabolic Panel; Future; Expected date: 10/31/2022  -     C-Reactive Protein; Future; Expected date: 10/31/2022  -     Sedimentation rate; Future; Expected date: 10/31/2022  -     Urinalysis; Future; Expected date: 10/31/2022      Follow up in about 4 months (around 11/18/2022).     Leonard Shelton MD

## 2022-07-07 LAB
QUANTIFERON MITOGEN MINUS NIL: 7.93 IU/ML
QUANTIFERON PLUS TB1 MINUS NIL: 0.01 IU/ML (ref 0–0.34)
QUANTIFERON PLUS TB2 MINUS NIL: 0.01 IU/ML (ref 0–0.34)
QUANTIFERON-TB GOLD PLUS: NEGATIVE
QUANTIFERON-TB NIL: 0.01 IU/ML

## 2022-07-14 ENCOUNTER — OFFICE VISIT (OUTPATIENT)
Dept: PULMONOLOGY | Facility: CLINIC | Age: 72
End: 2022-07-14
Payer: MEDICARE

## 2022-07-14 ENCOUNTER — CLINICAL SUPPORT (OUTPATIENT)
Dept: PULMONOLOGY | Facility: CLINIC | Age: 72
End: 2022-07-14
Payer: MEDICARE

## 2022-07-14 VITALS
OXYGEN SATURATION: 99 % | WEIGHT: 150.31 LBS | RESPIRATION RATE: 16 BRPM | HEART RATE: 93 BPM | HEIGHT: 67 IN | SYSTOLIC BLOOD PRESSURE: 122 MMHG | BODY MASS INDEX: 23.59 KG/M2 | DIASTOLIC BLOOD PRESSURE: 58 MMHG

## 2022-07-14 DIAGNOSIS — J84.9 ILD (INTERSTITIAL LUNG DISEASE): Primary | ICD-10-CM

## 2022-07-14 DIAGNOSIS — J84.9 INTERSTITIAL PULMONARY DISEASE, UNSPECIFIED: ICD-10-CM

## 2022-07-14 DIAGNOSIS — J84.9 ILD (INTERSTITIAL LUNG DISEASE): ICD-10-CM

## 2022-07-14 PROCEDURE — 94060 PR EVAL OF BRONCHOSPASM: ICD-10-PCS | Mod: S$GLB,,, | Performed by: INTERNAL MEDICINE

## 2022-07-14 PROCEDURE — 94729 PR C02/MEMBANE DIFFUSE CAPACITY: ICD-10-PCS | Mod: S$GLB,,, | Performed by: INTERNAL MEDICINE

## 2022-07-14 PROCEDURE — 94060 EVALUATION OF WHEEZING: CPT | Mod: S$GLB,,, | Performed by: INTERNAL MEDICINE

## 2022-07-14 PROCEDURE — 99214 PR OFFICE/OUTPT VISIT, EST, LEVL IV, 30-39 MIN: ICD-10-PCS | Mod: 25,S$GLB,, | Performed by: INTERNAL MEDICINE

## 2022-07-14 PROCEDURE — 94727 GAS DIL/WSHOT DETER LNG VOL: CPT | Mod: S$GLB,,, | Performed by: INTERNAL MEDICINE

## 2022-07-14 PROCEDURE — 99214 OFFICE O/P EST MOD 30 MIN: CPT | Mod: 25,S$GLB,, | Performed by: INTERNAL MEDICINE

## 2022-07-14 PROCEDURE — 94729 DIFFUSING CAPACITY: CPT | Mod: S$GLB,,, | Performed by: INTERNAL MEDICINE

## 2022-07-14 PROCEDURE — 94727 PR PULM FUNCTION TEST BY GAS: ICD-10-PCS | Mod: S$GLB,,, | Performed by: INTERNAL MEDICINE

## 2022-07-14 NOTE — ADDENDUM NOTE
Addended by: FELICIA ANDERSON on: 7/14/2022 06:48 PM     Modules accepted: Orders, Level of Service

## 2022-07-14 NOTE — PROGRESS NOTES
Subjective:    Patient Identification:   Patient ID: Alysa Abernathy is a 71 y.o. female.    Referring Provider:   Follow-up on interstitial lung disease    Chief Complaint:  Interstitial Lung Disease      History of Present Illness:    Alysa Abernathy is a 71 y.o. female who presents for the follow-up on her interstitial lung disease.  Patient has rheumatoid arthritis related mild interstitial lung disease based upon her HRCT on February 2022. At that time her lung functions showed mild decrease in diffusion capacity to 64% predicted with normal spirometry and lung volumes.  She has no had no significant deterioration in terms of clinical respiratory status.  She has some dry cough associated with occasional phlegm production mostly at nighttime.  She is taking Mucinex which does help.  No fever, chills, night sweats or weight loss etc.  have been reported.  Currently taking infliximab infusions every 8 weekly with methotrexate 20 mg p.o. weekly.  She has been taken off of prednisone 10 mg p.o. daily.  She has noted increase in her joint symptoms after being off of prednisone.  Dr. Shelton is managing her rheumatoid arthritis.  Markers of inflammation have been down since she has been on disease modifying agents.  QuantiFERON gold TB has been negative.  Recent CBC was unremarkable except expected macrocytosis.  She has had no recent chest x-ray or PFTs.   She does not see a dermatologist.  She had got recent steroid injections in her joints which helped her symptoms significantly.    Review of Systems:  Review of Systems   Constitutional: Negative for fever, chills, weight loss, weight gain, activity change, appetite change, fatigue, night sweats and weakness.   HENT: Negative for nosebleeds, postnasal drip, rhinorrhea, sinus pressure, sore throat, trouble swallowing, voice change, congestion, ear pain and hearing loss.    Eyes: Negative for redness and itching.   Respiratory: Positive for cough and dyspnea on  extertion. Negative for hemoptysis, sputum production, choking, chest tightness, shortness of breath, wheezing, orthopnea, previous hospitialization due to pulmonary problems, asthma nighttime symptoms, pleurisy, use of rescue inhaler and Paroxysmal Nocturnal Dyspnea.    Cardiovascular: Negative for chest pain, palpitations and leg swelling.   Genitourinary: Negative for difficulty urinating and hematuria.   Endocrine: Negative for polydipsia, polyphagia, cold intolerance, heat intolerance and polyuria.    Musculoskeletal: Negative for arthralgias, back pain, gait problem, joint swelling and myalgias.   Skin: Negative for rash.   Gastrointestinal: Negative for nausea, vomiting, abdominal pain, abdominal distention and acid reflux.   Neurological: Negative for dizziness, syncope, weakness, light-headedness and headaches.   Hematological: Negative for adenopathy. Does not bruise/bleed easily and no excessive bruising.   Psychiatric/Behavioral: Negative for confusion and sleep disturbance. The patient is not nervous/anxious.          Allergies: Review of patient's allergies indicates:  No Known Allergies    Medications:      Past Medical History:      Past Medical History:   Diagnosis Date    Arthritis     Cataract     Dry mouth     Thyroid disease        Family History:      Family History   Problem Relation Age of Onset    Hypertension Mother     Hyperlipidemia Father     Cancer Sister         Social History:      Past Surgical History:   Procedure Laterality Date    EYE SURGERY      HYSTERECTOMY      TONSILLECTOMY         Physical Exam:  Vitals:    07/14/22 0921   BP: (!) 122/58   Pulse: 93   Resp: 16     Physical Exam   Constitutional: She is oriented to person, place, and time. She appears not cachectic. No distress.   HENT:   Head: Normocephalic.   Right Ear: External ear normal.   Left Ear: External ear normal.   Nose: Nose normal. No mucosal edema. No polyps.   Mouth/Throat: Oropharynx is clear and  moist. Normal dentition. No oropharyngeal exudate.   Neck: No JVD present. No tracheal deviation present. No thyromegaly present.   Cardiovascular: Normal rate, regular rhythm, normal heart sounds and intact distal pulses. Exam reveals no gallop and no friction rub.   No murmur heard.  Pulmonary/Chest: Normal expansion, symmetric chest wall expansion and effort normal. No stridor. No respiratory distress. She has no decreased breath sounds. She has no wheezes. She has no rhonchi. Chest wall is not dull to percussion. She exhibits no tenderness.   Bilateral inspiratory crackles on bases Negative for egophony. Negative for tactile fremitus.   Abdominal: Soft. Bowel sounds are normal. She exhibits no distension and no mass. There is no hepatosplenomegaly. There is no abdominal tenderness. There is no rebound and no guarding. No hernia.   Musculoskeletal:         General: No tenderness, deformity or edema. Normal range of motion.      Cervical back: Normal range of motion and neck supple.   Lymphadenopathy: No supraclavicular adenopathy is present.     She has no cervical adenopathy.     She has no axillary adenopathy.   Neurological: She is alert and oriented to person, place, and time. She has normal reflexes. She displays normal reflexes. No cranial nerve deficit. She exhibits normal muscle tone.   Skin: Skin is warm and dry. No rash noted. She is not diaphoretic. No cyanosis or erythema. No pallor. Nails show no clubbing.   Psychiatric: She has a normal mood and affect. Her behavior is normal. Judgment and thought content normal.            CT Chest Without Contrast                                RADIOLOGY REPORT        PT NAME: IBAN HERNANDEZ      [facility]     : 1950 F 71             [address]    ACCT: [id number]                                              [facility] Rec #: [id number]                                        [id number]    [facility]             Procedure: CHEST THORAX WO CONT    REQUISITION #: [id number]      REPORT #: [id number]           DATE OF EXAM: 02/11/22    TIME OF EXAM: 1400       CT CHEST        CMS MANDATED QUALITY DATA CT RADIATION 436    *All CT scans at this facility uses dose modulation and/or weight-based   dosing when appropriate to reduce radiation dose to as low as reasonably   achievable.        Clinical history: Interstitial pulmonary disease        Technique:    Acquisition: Contiguous scan slices were obtained from the apex of the lungs   through the diaphragms.    Reconstructions: Axial, coronal and sagittal. reconstructions of the data   set were obtained.    Contrast:    IV: No contrast was administered.    Estimated radiation dose: 11.41 mSv.        Comparison: No prior relevant studies are available.        Findings:        Lungs, pleura and large airways:    Mild interstitial thickening is noted in a peripheral distribution with   subpleural sparing. The pattern is most consistent with NSIP with no areas   of honeycombing or evidence of significant fibrotic disease.        Mild bronchiectasis is noted, mostly in the lower lung fields.        No suspicious pulmonary nodules or masses.        Heart: No cardiomegaly. No pericardial effusion.        Systemic vasculature: Normal.        Pulmonary vasculature: Grossly normal.        Mediastinal and hilar structures: No mediastinal mass. No enlarged or   morphologically abnormal mediastinal or hilar lymph nodes.        Chest wall, axilla and lower neck: The thyroid gland is normal. The lower   neck soft tissues are unremarkable. No axillary lesions.        Upper abdomen: Normal        Osseous structures: An age-indeterminate compression deformity is noted at   T12. There is loss in several of the mid thoracic vertebral body heights   which is suspected degenerative. The bones are diffusely demineralized.        Impression        1.  Peripheral interstitial  thickening with subpleural sparing in a pattern    most consistent with NSIP.        2.  No evidence of significant fibrotic disease.                DICTATING PHYSICIAN:  NATALIA COOPER MD                   Date Dictated: 02/11/22 1351        Signed By:  NATALIA COOPER MD <Electronically signed by NATALIA COOPER MD in OV>    Date Signed:  02/11/22 1355     CC: FELICIA ANDERSON MD ; FELICIA ANDERSON MD      ADMITTING PHYSICIAN:                                                                                                    ORDERING PHY: FELICIA ANDERSON MD                                                                                                                                                      ATTENDING PHY: FELICIA ANDERSON MD    Patient Status:  REG CLI    Admit Service Date: 02/11/22                Accessory Clinical Data:  Chest x-ray:    CT scan:     PFTs:     6MWT:     TTE:    Lab data:    All radiographic imaging of the chest, PFT tracings/data, and 6MWT data have been independently reviewed and interpreted unless otherwise specified.     Assessment and Plan:        Problem List Items Addressed This Visit    None     Visit Diagnoses     ILD (interstitial lung disease)    -  Primary    Relevant Orders    X-Ray Chest PA And Lateral    Complete PFT with bronchodilator         Orders Placed This Encounter   Procedures    X-Ray Chest PA And Lateral     Standing Status:   Future     Number of Occurrences:   1     Standing Expiration Date:   7/14/2023     Order Specific Question:   May the Radiologist modify the order per protocol to meet the clinical needs of the patient?     Answer:   Yes    Complete PFT with bronchodilator     Standing Status:   Future     Number of Occurrences:   1     Standing Expiration Date:   7/14/2023     Order Specific Question:   Release to patient     Answer:   Immediate      Patient will need serial chest imaging with PFTs for surveillance of interstitial lung  disease.  Based upon last HRCT there were no fibrotic changes.  There was no restriction on last PFTs from January 2022 but diffusion capacity was mildly reduced.  Today's respiratory examination has not changed when compared to January 2022.   Start Aerobika for secretion clearance and help with cough.  HRCT to be performed based upon clinical picture.  Continue with infliximab and methotrexate as instituted by Dr. Shelton.  Recommended to get preventative vaccinations.  She is up-to-date on COVID-19 vaccinations but has never received and does not want to get influenza or pneumonia vaccinations.    PFTs reviewed, there is decline in diffusion capacity and now there is evidence of mild interstitial lung disease as the ratio of FEV1 to FVC is no 72% predicted with an FVC of 90% predicted.  Diffusion capacity has dropped to 57% predicted but corrects for alveolar volume.    CXR reviewed.  Increased interstitial markings consistent with interstitial lung disease.  No previous chest x-rays available to compare.  Will need a repeat high-resolution CT scan next month which will be 6 months from previous CT scan in February for a more relevant comparison.        Follow up in about 6 months (around 1/14/2023) for With PFTs and two-view chest x-ray.     This note is dictated on M*Modal word recognition program.  There are word recognition mistakes that are occasionally missed on review.

## 2022-07-18 ENCOUNTER — OFFICE VISIT (OUTPATIENT)
Dept: RHEUMATOLOGY | Facility: CLINIC | Age: 72
End: 2022-07-18
Payer: MEDICARE

## 2022-07-18 VITALS
BODY MASS INDEX: 23.23 KG/M2 | WEIGHT: 148 LBS | HEART RATE: 77 BPM | DIASTOLIC BLOOD PRESSURE: 77 MMHG | RESPIRATION RATE: 15 BRPM | HEIGHT: 67 IN | SYSTOLIC BLOOD PRESSURE: 132 MMHG | OXYGEN SATURATION: 98 %

## 2022-07-18 DIAGNOSIS — J84.9 INTERSTITIAL LUNG DISEASE: ICD-10-CM

## 2022-07-18 DIAGNOSIS — M15.8 OTHER OSTEOARTHRITIS INVOLVING MULTIPLE JOINTS: ICD-10-CM

## 2022-07-18 DIAGNOSIS — M85.89 OSTEOPENIA OF MULTIPLE SITES: ICD-10-CM

## 2022-07-18 DIAGNOSIS — Z51.81 MEDICATION MONITORING ENCOUNTER: ICD-10-CM

## 2022-07-18 DIAGNOSIS — M67.912 ROTATOR CUFF DYSFUNCTION, LEFT: ICD-10-CM

## 2022-07-18 DIAGNOSIS — M05.79 RHEUMATOID ARTHRITIS INVOLVING MULTIPLE SITES WITH POSITIVE RHEUMATOID FACTOR: Primary | ICD-10-CM

## 2022-07-18 DIAGNOSIS — Z79.899 HIGH RISK MEDICATION USE: ICD-10-CM

## 2022-07-18 PROCEDURE — 99214 PR OFFICE/OUTPT VISIT, EST, LEVL IV, 30-39 MIN: ICD-10-PCS | Mod: S$GLB,,, | Performed by: INTERNAL MEDICINE

## 2022-07-18 PROCEDURE — 99214 OFFICE O/P EST MOD 30 MIN: CPT | Mod: S$GLB,,, | Performed by: INTERNAL MEDICINE

## 2022-07-18 RX ORDER — PNV NO.95/FERROUS FUM/FOLIC AC 28MG-0.8MG
1 TABLET ORAL 2 TIMES DAILY
Qty: 60 EACH | Refills: 4 | Status: SHIPPED | OUTPATIENT
Start: 2022-07-18 | End: 2022-11-07 | Stop reason: SDUPTHER

## 2022-07-18 RX ORDER — FOLIC ACID 1 MG/1
1 TABLET ORAL DAILY
Qty: 30 TABLET | Refills: 6 | Status: SHIPPED | OUTPATIENT
Start: 2022-07-18 | End: 2022-11-21 | Stop reason: SDUPTHER

## 2022-07-18 RX ORDER — HYDROXYCHLOROQUINE SULFATE 200 MG/1
200 TABLET, FILM COATED ORAL DAILY
Qty: 30 TABLET | Refills: 4 | Status: SHIPPED | OUTPATIENT
Start: 2022-07-18 | End: 2022-11-21 | Stop reason: SDUPTHER

## 2022-07-18 RX ORDER — METHOTREXATE 2.5 MG/1
20 TABLET ORAL
Qty: 32 TABLET | Refills: 4 | Status: SHIPPED | OUTPATIENT
Start: 2022-07-18 | End: 2022-11-07 | Stop reason: SDUPTHER

## 2022-09-07 ENCOUNTER — PATIENT MESSAGE (OUTPATIENT)
Dept: RHEUMATOLOGY | Facility: CLINIC | Age: 72
End: 2022-09-07
Payer: MEDICARE

## 2022-11-02 LAB
ALBUMIN SERPL BCP-MCNC: 3.9 G/DL (ref 3.4–5)
ALBUMIN/GLOBULIN RATIO: 1.08 RATIO (ref 1.1–1.8)
ALP SERPL-CCNC: 76 U/L (ref 46–116)
ALT SERPL W P-5'-P-CCNC: 24 U/L (ref 12–78)
ANION GAP SERPL CALC-SCNC: 4 MMOL/L (ref 3–11)
APPEARANCE, UA: CLEAR
AST SERPL-CCNC: 22 U/L (ref 15–37)
BASOPHILS NFR BLD: 1.3 % (ref 0–3)
BILIRUB SERPL-MCNC: 0.5 MG/DL (ref 0–1)
BILIRUB UR QL STRIP: NEGATIVE MG/DL
BUN SERPL-MCNC: 12 MG/DL (ref 7–18)
BUN/CREAT SERPL: 16.21 RATIO (ref 7–18)
CALCIUM SERPL-MCNC: 9 MG/DL (ref 8.8–10.5)
CHLORIDE SERPL-SCNC: 103 MMOL/L (ref 100–108)
CO2 SERPL-SCNC: 31 MMOL/L (ref 21–32)
COLOR UR: ABNORMAL
CREAT SERPL-MCNC: 0.74 MG/DL (ref 0.55–1.02)
CREATINE KINASE, SERUM: 38 U/L (ref 26–308)
CRP QUANTITATIVE: 0.7 MG/DL (ref 0–0.9)
EOSINOPHIL NFR BLD: 4 % (ref 1–3)
ERYTHROCYTE [DISTWIDTH] IN BLOOD BY AUTOMATED COUNT: 15 % (ref 12.5–18)
ERYTHROCYTE [SEDIMENTATION RATE] IN BLOOD: 11 MM/HR (ref 0–20)
GFR ESTIMATION: > 60
GLOBULIN: 3.6 G/DL (ref 2.3–3.5)
GLUCOSE (UA): NORMAL MG/DL
GLUCOSE SERPL-MCNC: 93 MG/DL (ref 70–110)
HCT VFR BLD AUTO: 40.3 % (ref 37–47)
HGB BLD-MCNC: 13 G/DL (ref 12–16)
HGB UR QL STRIP: NEGATIVE /UL
KETONES UR QL STRIP: NEGATIVE MG/DL
LEUKOCYTE ESTERASE UR QL STRIP: NEGATIVE /UL
LYMPHOCYTES NFR BLD: 33.3 % (ref 25–40)
MACROCYTES BLD QL SMEAR: NORMAL
MCH RBC QN AUTO: 32.6 PG (ref 27–31.2)
MCHC RBC AUTO-ENTMCNC: 32.3 G/DL (ref 31.8–35.4)
MCV RBC AUTO: 101 FL (ref 80–97)
MONOCYTES NFR BLD: 11.4 % (ref 1–15)
NEUTROPHILS # BLD AUTO: 2.74 10*3/UL (ref 1.8–7.7)
NEUTROPHILS NFR BLD: 49.6 % (ref 37–80)
NITRITE UR QL STRIP: NEGATIVE
NUCLEATED RED BLOOD CELLS: 0 %
PH UR STRIP: 6.5 PH (ref 5–9)
PLATELETS: 264 10*3/UL (ref 142–424)
POTASSIUM SERPL-SCNC: 4 MMOL/L (ref 3.6–5.2)
PROT SERPL-MCNC: 7.5 G/DL (ref 6.4–8.2)
PROT UR QL STRIP: NEGATIVE MG/DL
RBC # BLD AUTO: 3.99 10*6/UL (ref 4.2–5.4)
SODIUM BLD-SCNC: 138 MMOL/L (ref 135–145)
SP GR UR STRIP: 1.01 (ref 1–1.03)
SPECIMEN COLLECTION METHOD, URINE: ABNORMAL
UROBILINOGEN UR STRIP-ACNC: NORMAL MG/DL
WBC # BLD: 5.5 10*3/UL (ref 4.6–10.2)

## 2022-11-07 DIAGNOSIS — Z79.899 HIGH RISK MEDICATION USE: ICD-10-CM

## 2022-11-07 DIAGNOSIS — M05.79 RHEUMATOID ARTHRITIS INVOLVING MULTIPLE SITES WITH POSITIVE RHEUMATOID FACTOR: ICD-10-CM

## 2022-11-07 DIAGNOSIS — M85.89 OSTEOPENIA OF MULTIPLE SITES: ICD-10-CM

## 2022-11-07 RX ORDER — METHOTREXATE 2.5 MG/1
20 TABLET ORAL
Qty: 32 TABLET | Refills: 4 | Status: SHIPPED | OUTPATIENT
Start: 2022-11-07 | End: 2022-11-21 | Stop reason: SDUPTHER

## 2022-11-07 RX ORDER — PNV NO.95/FERROUS FUM/FOLIC AC 28MG-0.8MG
1 TABLET ORAL 2 TIMES DAILY
Qty: 60 EACH | Refills: 4 | Status: SHIPPED | OUTPATIENT
Start: 2022-11-07 | End: 2022-11-21 | Stop reason: SDUPTHER

## 2022-11-07 NOTE — PROGRESS NOTES
Subjective:      Patient ID: Alysa Abernathy is a 72 y.o. female.    Chief Complaint: Disease Management    HPI:   Includes joint pain without subjective swelling.   Antecedent event includes: None;  Pain location includes: hands;  Gradual onset; beginning >years ago;  Constant ache, Mild in severity,   Lasting >minutes, Worse during the daytime;   Improved with rest, medication, change in position, and none;   Worsened with activity, overuse, stress, and rest;         Review of Systems   Constitutional:  Negative for fever and unexpected weight change.   HENT:  Negative for mouth sores and trouble swallowing.    Eyes:  Positive for eye dryness. Negative for redness.   Respiratory:  Negative for cough and shortness of breath.    Cardiovascular:  Negative for chest pain.   Gastrointestinal:  Negative for constipation and diarrhea.   Genitourinary:  Negative for dysuria and genital sores.   Musculoskeletal:  Positive for arthralgias.   Integumentary:  Negative for rash.   Neurological:  Positive for headaches.   Hematological:  Bruises/bleeds easily.   Psychiatric/Behavioral:  Negative for sleep disturbance.     Past Medical History:   Diagnosis Date    Arthritis     Cataract     Dry mouth     Thyroid disease      Past Surgical History:   Procedure Laterality Date    EYE SURGERY      HYSTERECTOMY      TONSILLECTOMY        See the Assessment/Plan for further characterization of the HPI, ROS, Medical, Surgical, Family, and Social Histories including Tobacco use, Meds; all of which has been reviewed in Epic.    Medication List with Changes/Refills   Current Medications    ALBUTEROL (PROVENTIL) 5 MG/ML NEBULIZER SOLUTION    Take 2.5 mg by nebulization every 6 (six) hours as needed for Wheezing. Rescue    LEVOTHYROXINE (SYNTHROID) 100 MCG TABLET    Take 100 mcg by mouth once daily.    PREDNISONE (DELTASONE) 10 MG TABLET    Take 1 tablet (10 mg total) by mouth daily as needed (pain with swelling).   Changed and/or Refilled  "Medications    Modified Medication Previous Medication    CALCIUM CARBONATE-VITAMIN D3 (CALCIUM 500 + D) 500 MG-10 MCG (400 UNIT) TAB calcium carbonate-vitamin D3 (CALCIUM 500 + D) 500 mg-10 mcg (400 unit) Tab       Take 1 tablet by mouth 2 (two) times a day.    Take 1 tablet by mouth 2 (two) times a day.    FOLIC ACID (FOLVITE) 1 MG TABLET folic acid (FOLVITE) 1 MG tablet       Take 1 tablet (1 mg total) by mouth once daily.    Take 1 tablet (1 mg total) by mouth once daily.    HYDROXYCHLOROQUINE (PLAQUENIL) 200 MG TABLET hydrOXYchloroQUINE (PLAQUENIL) 200 mg tablet       Take 1 tablet (200 mg total) by mouth once daily.    Take 1 tablet (200 mg total) by mouth once daily.    METHOTREXATE 2.5 MG TAB methotrexate 2.5 MG Tab       Take 8 tablets (20 mg total) by mouth every 7 days.    Take 8 tablets (20 mg total) by mouth every 7 days.   Discontinued Medications    LEVOTHYROXINE (SYNTHROID) 75 MCG TABLET    Take 75 mcg by mouth once daily.         Objective:   /67   Pulse 83   Resp 16   Ht 5' 7" (1.702 m)   Wt 67.6 kg (149 lb 1.6 oz)   SpO2 96%   BMI 23.35 kg/m²   Physical Exam  Non-toxic appearance. No distress.   Normocephalic and atraumatic.   External ears normal.    Conjunctivae and EOM are normal. No scleral icterus.   RRR, No friction rub; palpable distal pulses.    No tachypnea or signs of respiratory distress.   Abdominal: No guarding or rebound tenderness.   Neurological: Oriented. Normal thought content. No cranial nerve deficit. Strength is grossly normal without focal deficit.  Skin is warm. No pallor.   Musculoskeletal: DJD. see below for further input. No overt synovitis.     CT Chest Without Contrast                                RADIOLOGY REPORT        PT NAME: IBAN HERNANDEZ      [facility]     : 1950 F 71             [address]    ACCT: [id number]                                              [facility] Rec #: [id number]                    "                     [id number]    Patient Location: [facility].RAD/             Procedure: CHEST THORAX WO CONT    REQUISITION #: [id number]      REPORT #: [id number]           DATE OF EXAM: 07/21/22    TIME OF EXAM: 1300       CMS MANDATED QUALITY DATA-CT radiation-[id number]        All CT scans at this facility utilize dose modulation, iterative   reconstruction, and/or weight based dosing when appropriate to reduce   radiation dose to as low as reasonably achievable.        CT chest        Clinical data:    Interstitial pulmonary disease        Technique:    Acquisition: Contiguous scan slices were obtained from the apex of the lungs   through the diaphragms.    Reconstructions: Axial, coronal and sagittal. reconstructions of the data   set were obtained.    Contrast:    IV: None    Other: None administered.    Phases: One    Estimated radiation dose: 2. mSv.    All CT scans at this facility use dose modulation, iterative reconstruction    and/or weight based dosing when appropriate to reduce radiation dose to as   low as reasonably achievable.        Limitations: The images appear technically satisfactory.        Comparison:    2/11/2022        Findings:    Lungs, pleura and large airways: There are reticular interstitial thickening   changes and some traction bronchiectasis with subpleural and lower lung zone   predominance bilaterally. No significant change. No pleural effusions. No   suspicious lung nodules. No definite honeycombing.        Heart: Normal.        Thoracic vasculature: Moderate coronary artery calcification.        Pulmonary vasculature: Normal.        Mediastinal and hilar structures: Normal.        Chest wall, axilla and lower neck: Normal.        Upper abdomen: Normal.        Osseous structures: DJD. Multiple vertebral compression deformities   including severe deformity of T12 with some retropulsion. Stable appearance.        Additional findings: None seen.        Impression        1.  Interstitial disease  changes. Stable appearance.        2.  CAD.        3.  Stable T12 compression fracture with retropulsion.        DICTATING PHYSICIAN:  NAVI HORAN MD                   Date Dictated: 07/21/22 1314        Signed By:  NAVI HORAN MD <Electronically signed by NAVI HORAN MD in OV>    Date Signed:  07/21/22 1319     CC: FELICIA ANDERSON MD ; FELICIA ANDERSON MD      ADMITTING PHYSICIAN:                                                                                                    ORDERING PHY: FELICIA ANDERSON MD                                                                                                                                                      ATTENDING PHY: FELICIA ANDERSON MD    Patient Status:  REG CLI    Admit Service Date: 07/21/22         Orders Only on 11/02/2022   Component Date Value Ref Range Status    WBC 11/02/2022 5.5  4.6 - 10.2 10*3/uL Final    RBC 11/02/2022 3.99 (L)  4.2 - 5.4 10*6/uL Final    Hemoglobin 11/02/2022 13.0  12.0 - 16.0 g/dL Final    Hematocrit 11/02/2022 40.3  37.0 - 47.0 % Final    MCV 11/02/2022 101.0 (H)  80 - 97 fL Final    MCH 11/02/2022 32.6 (H)  27.0 - 31.2 pg Final    MCHC 11/02/2022 32.3  31.8 - 35.4 g/dL Final    RDW RBC Auto-Rto 11/02/2022 15.0  12.5 - 18.0 % Final    Platelets 11/02/2022 264  142 - 424 10*3/uL Final    Neutrophils 11/02/2022 49.6  37 - 80 % Final    Lymphocytes 11/02/2022 33.3  25 - 40 % Final    Monocytes 11/02/2022 11.4  1 - 15 % Final    Eosinophils 11/02/2022 4.0 (H)  1 - 3 % Final    Basophils 11/02/2022 1.3  0 - 3 % Final    nRBC# 11/02/2022 0.0  % Final    Neutrophils Absolute 11/02/2022 2.74  1.8 - 7.7 10*3/uL Final    Macrocytes 11/02/2022 1+   Final    Sed Rate 11/02/2022 11  0 - 20 mm/hr Final   Orders Only on 11/02/2022   Component Date Value Ref Range Status    Sodium 11/02/2022 138  135 - 145 mmol/L Final    Potassium 11/02/2022 4.0  3.6 - 5.2 mmol/L Final    Chloride 11/02/2022 103  100 - 108 mmol/L Final     CO2 11/02/2022 31  21 - 32 mmol/L Final    Anion Gap 11/02/2022 4.0  3.0 - 11.0 mmol/L Final    BUN 11/02/2022 12  7 - 18 mg/dL Final    Creatinine 11/02/2022 0.74  0.55 - 1.02 mg/dL Final    GFR ESTIMATION 11/02/2022 > 60  >60 Final               DESCRIPTION       GLOMERULAR FILTRATION RATE             NORMAL                     >60             KIDNEY  DISEASE           15-60             KIDNEY FAILURE             <15    BUN/Creatinine Ratio 11/02/2022 16.21  7 - 18 Ratio Final    Glucose 11/02/2022 93  70 - 110 mg/dL Final    Calcium 11/02/2022 9.0  8.8 - 10.5 mg/dL Final    Total Bilirubin 11/02/2022 0.5  0.0 - 1.0 mg/dL Final    AST 11/02/2022 22  15 - 37 U/L Final    ALT 11/02/2022 24  12 - 78 U/L Final    Total Protein 11/02/2022 7.5  6.4 - 8.2 g/dL Final    Albumin 11/02/2022 3.9  3.4 - 5.0 g/dL Final    Globulin 11/02/2022 3.6 (H)  2.3 - 3.5 g/dL Final    Albumin/Globulin Ratio 11/02/2022 1.083 (L)  1.1 - 1.8 Ratio Final    Alkaline Phosphatase 11/02/2022 76  46 - 116 U/L Final    CK, Serum 11/02/2022 38  26 - 308 U/L Final    CRP QUANTITATIVE 11/02/2022 0.7  0.0 - 0.9 mg/dL Final   Orders Only on 11/02/2022   Component Date Value Ref Range Status    Specimen Collection Method 11/02/2022 Void   Final    Color, UA 11/02/2022 Danae (A)  Yellow Final    Appearance, UA 11/02/2022 Clear  Clear Final    pH, UA 11/02/2022 6.5  5.0 - 9.0 pH Final    Specific Gravity, UA 11/02/2022 1.015  1.000 - 1.030 Final    Protein, UA 11/02/2022 Negative  Negative mg/dL Final    Glucose, UA 11/02/2022 Normal  Normal mg/dL Final    Ketones, UA 11/02/2022 Negative  Negative mg/dL Final    Occult Blood UA 11/02/2022 Negative  Negative /uL Final    Nitrite, UA 11/02/2022 Negative  Negative Final    Bilirubin (UA) 11/02/2022 Negative  Negative mg/dL Final    Urobilinogen, UA 11/02/2022 Normal  Normal mg/dL Final    Leukocytes, UA 11/02/2022 Negative  Negative /uL Final   Orders Only on 07/05/2022   Component Date Value Ref Range  Status    QuantiFERON-TB Gold Plus 07/05/2022 NEGATIVE  Negative Final    Comment: Interpretive Data: Quantiferon TB Gold PlusInterferon gamma release is measured for specimens from each ofthe four collection tubes. A qualitative result (Negative,Positive, or Indeterminate) is based on interpretation of thefour values, NIL, MITOGEN   minus NIL (MITOGEN-NIL), TB1 minus NIL(TB1-NIL), and TB2 minus NIL (TB2-NIL). The NIL value representsnonspecific reactivity produced by the patient specimen. TheMITOGEN-NIL value serves as the positive control for the patientspecimen, demonstrating   successful lymphocyte activity. TheTB1-NIL tube specifically detects CD4+ lymphocyte reactivity,specifically stimulated by the TB1 antigens. The TB2-NIL tubedetects both CD4+ and CD8+ lymphocyte reactivity, stimulated byTB2 antigens. An overall Negative   result does not completelyrule out TB infection.A false-positive result in the absence of other clinicalevidence of TB infection is not uncommon. Refer to: UpdatedGuidelines for Using Interferon Gamma Release Assays to DetectMycobacterium                            tuberculosis   Infection --- [place], 2010(http://www.cdc.gov/mmwr/preview/mmwrhtml/jy6166o6.htm), formore information concerning test performance in low-prevalencepopulations and use in occupational screening.      QuantiFERON TB1 NIL 07/05/2022 0.01  0.00 - 0.34 IU/mL Final    QuantiFERON TB2 NIL 07/05/2022 0.01  0.00 - 0.34 IU/mL Final    QuantiFERON Mitogen Minus NIL 07/05/2022 7.93  IU/mL Final    QuantiFERON TB NIL 07/05/2022 0.01  IU/mL Final    Performed By: [facility] [place] [id number] Director: Ken Dash MD, PhD   Orders Only on 07/05/2022   Component Date Value Ref Range Status    WBC 07/05/2022 6.1  4.6 - 10.2 10*3/uL Final    RBC 07/05/2022 4.24  4.2 - 5.4 10*6/uL Final    Hemoglobin 07/05/2022 13.5  12.0 - 16.0 g/dL Final    Hematocrit 07/05/2022 42.5  37.0 -  47.0 % Final    MCV 07/05/2022 100.2 (H)  80 - 97 fL Final    MCH 07/05/2022 31.8 (H)  27.0 - 31.2 pg Final    MCHC 07/05/2022 31.8  31.8 - 35.4 g/dL Final    RDW RBC Auto-Rto 07/05/2022 15.9  12.5 - 18.0 % Final    Platelets 07/05/2022 291  142 - 424 10*3/uL Final    Neutrophils 07/05/2022 51.7  37 - 80 % Final    Lymphocytes 07/05/2022 36.8  25 - 40 % Final    Monocytes 07/05/2022 6.8  1 - 15 % Final    Eosinophils 07/05/2022 2.9  1 - 3 % Final    Basophils 07/05/2022 1.5  0 - 3 % Final    nRBC# 07/05/2022 0.0  % Final    Neutrophils Absolute 07/05/2022 3.17  1.8 - 7.7 10*3/uL Final    Macrocytes 07/05/2022 1+   Final    Sed Rate 07/05/2022 13  0 - 20 mm/hr Final   Orders Only on 07/05/2022   Component Date Value Ref Range Status    Sodium 07/05/2022 137  135 - 145 mmol/L Final    Potassium 07/05/2022 4.0  3.6 - 5.2 mmol/L Final    Chloride 07/05/2022 102  100 - 108 mmol/L Final    CO2 07/05/2022 31  21 - 32 mmol/L Final    Anion Gap 07/05/2022 4.0  3.0 - 11.0 mmol/L Final    BUN 07/05/2022 12  7 - 18 mg/dL Final    Creatinine 07/05/2022 0.73  0.55 - 1.02 mg/dL Final    GFR ESTIMATION 07/05/2022 > 60  >60 Final               DESCRIPTION       GLOMERULAR FILTRATION RATE             NORMAL                     >60             KIDNEY  DISEASE           15-60             KIDNEY FAILURE             <15    BUN/Creatinine Ratio 07/05/2022 16.43  7 - 18 Ratio Final    Glucose 07/05/2022 90  70 - 110 mg/dL Final    Calcium 07/05/2022 9.0  8.8 - 10.5 mg/dL Final    Total Bilirubin 07/05/2022 0.5  0.0 - 1.0 mg/dL Final    AST 07/05/2022 45 (H)  15 - 37 U/L Final    ALT 07/05/2022 60  12 - 78 U/L Final    Total Protein 07/05/2022 7.8  6.4 - 8.2 g/dL Final    Albumin 07/05/2022 4.3  3.4 - 5.0 g/dL Final    Globulin 07/05/2022 3.5  2.3 - 3.5 g/dL Final    Albumin/Globulin Ratio 07/05/2022 1.228  1.1 - 1.8 Ratio Final    Alkaline Phosphatase 07/05/2022 82  46 - 116 U/L Final    CK, Serum 07/05/2022 41  26 - 308 U/L Final    CRP  QUANTITATIVE 07/05/2022 < 0.2  0.0 - 0.9 mg/dL Final   Orders Only on 03/21/2022   Component Date Value Ref Range Status    Sodium 03/21/2022 142  135 - 145 mmol/L Final    Potassium 03/21/2022 4.1  3.6 - 5.2 mmol/L Final    Chloride 03/21/2022 107  100 - 108 mmol/L Final    CO2 03/21/2022 26  21 - 32 mmol/L Final    Anion Gap 03/21/2022 9.0  3.0 - 11.0 mmol/L Final    BUN 03/21/2022 12  7 - 18 mg/dL Final    Creatinine 03/21/2022 0.79  0.55 - 1.02 mg/dL Final    GFR ESTIMATION 03/21/2022 > 60  >60 Final               DESCRIPTION       GLOMERULAR FILTRATION RATE             NORMAL                     >60             KIDNEY  DISEASE           15-60             KIDNEY FAILURE             <15    BUN/Creatinine Ratio 03/21/2022 15.18  7 - 18 Ratio Final    Glucose 03/21/2022 96  70 - 110 mg/dL Final    Calcium 03/21/2022 8.5 (L)  8.8 - 10.5 mg/dL Final    Total Bilirubin 03/21/2022 0.5  0.0 - 1.0 mg/dL Final    AST 03/21/2022 23  15 - 37 U/L Final    ALT 03/21/2022 29  12 - 78 U/L Final    Total Protein 03/21/2022 6.5  6.4 - 8.2 g/dL Final    Albumin 03/21/2022 3.8  3.4 - 5.0 g/dL Final    Globulin 03/21/2022 2.7  2.3 - 3.5 g/dL Final    Albumin/Globulin Ratio 03/21/2022 1.407  1.1 - 1.8 Ratio Final    Alkaline Phosphatase 03/21/2022 88  46 - 116 U/L Final    CK, Serum 03/21/2022 42  26 - 308 U/L Final    CRP QUANTITATIVE 03/21/2022 < 0.2  0.0 - 0.9 mg/dL Final   Orders Only on 03/21/2022   Component Date Value Ref Range Status    WBC 03/21/2022 4.5 (L)  4.6 - 10.2 10*3/uL Final    RBC 03/21/2022 3.96 (L)  4.2 - 5.4 10*6/uL Final    Hemoglobin 03/21/2022 12.3  12.0 - 16.0 g/dL Final    Hematocrit 03/21/2022 39.2  37.0 - 47.0 % Final    MCV 03/21/2022 99.0 (H)  80 - 97 fL Final    MCH 03/21/2022 31.1  27.0 - 31.2 pg Final    MCHC 03/21/2022 31.4 (L)  31.8 - 35.4 g/dL Final    RDW RBC Auto-Rto 03/21/2022 13.8  12.5 - 18.0 % Final    Platelets 03/21/2022 266  142 - 424 10*3/uL Final    Neutrophils 03/21/2022 44.4  37 - 80  % Final    Lymphocytes 03/21/2022 40.4 (H)  25 - 40 % Final    Monocytes 03/21/2022 7.9  1 - 15 % Final    Eosinophils 03/21/2022 6.0 (H)  1 - 3 % Final    Basophils 03/21/2022 1.3  0 - 3 % Final    nRBC# 03/21/2022 0.0  % Final    Neutrophils Absolute 03/21/2022 2.01  1.8 - 7.7 10*3/uL Final    Sed Rate 03/21/2022 19  0 - 20 mm/hr Final   Orders Only on 12/13/2021   Component Date Value Ref Range Status    Sodium 12/13/2021 139  135 - 145 mmol/L Final    Potassium 12/13/2021 4.4  3.6 - 5.2 mmol/L Final    Chloride 12/13/2021 102  100 - 108 mmol/L Final    CO2 12/13/2021 29  21 - 32 mmol/L Final    Anion Gap 12/13/2021 8.0  3.0 - 11.0 mmol/L Final    BUN 12/13/2021 13  7 - 18 mg/dL Final    Creatinine 12/13/2021 0.73  0.55 - 1.02 mg/dL Final    GFR ESTIMATION 12/13/2021 > 60  >60 Final               DESCRIPTION       GLOMERULAR FILTRATION RATE             NORMAL                     >60             KIDNEY  DISEASE           15-60             KIDNEY FAILURE             <15    BUN/Creatinine Ratio 12/13/2021 17.80  7 - 18 Ratio Final    Glucose 12/13/2021 98  70 - 110 mg/dL Final    Calcium 12/13/2021 9.0  8.8 - 10.5 mg/dL Final    Total Bilirubin 12/13/2021 0.6  0.0 - 1.0 mg/dL Final    AST 12/13/2021 26  15 - 37 U/L Final    ALT 12/13/2021 35  12 - 78 U/L Final    Total Protein 12/13/2021 6.6  6.4 - 8.2 g/dL Final    Albumin 12/13/2021 3.9  3.4 - 5.0 g/dL Final    Globulin 12/13/2021 2.7  2.3 - 3.5 g/dL Final    Albumin/Globulin Ratio 12/13/2021 1.444  1.1 - 1.8 Ratio Final    Alkaline Phosphatase 12/13/2021 76  46 - 116 U/L Final    CK, Serum 12/13/2021 35  26 - 308 U/L Final    CRP QUANTITATIVE 12/13/2021 < 0.2  0.0 - 0.9 mg/dL Final   Orders Only on 12/13/2021   Component Date Value Ref Range Status    WBC 12/13/2021 8.6  4.6 - 10.2 10*3/uL Final    RBC 12/13/2021 3.82 (L)  4.2 - 5.4 10*6/uL Final    Hemoglobin 12/13/2021 12.3  12.0 - 16.0 g/dL Final    Hematocrit 12/13/2021 39.7  37.0 - 47.0 % Final    MCV  12/13/2021 103.9 (H)  80 - 97 fL Final    MCH 12/13/2021 32.2 (H)  27.0 - 31.2 pg Final    MCHC 12/13/2021 31.0 (L)  31.8 - 35.4 g/dL Final    RDW RBC Auto-Rto 12/13/2021 14.9  12.5 - 18.0 % Final    Platelets 12/13/2021 299  142 - 424 10*3/uL Final    Neutrophils 12/13/2021 69.4  37 - 80 % Final    Lymphocytes 12/13/2021 23.0 (L)  25 - 40 % Final    Monocytes 12/13/2021 4.3  1 - 15 % Final    Eosinophils 12/13/2021 2.0  1 - 3 % Final    Basophils 12/13/2021 0.7  0 - 3 % Final    nRBC# 12/13/2021 0.0  % Final    Neutrophils Absolute 12/13/2021 5.95  1.8 - 7.7 10*3/uL Final    Macrocytes 12/13/2021 1+   Final    Sed Rate 12/13/2021 20  0 - 20 mm/hr Final   There may be more visits with results that are not included.        All of the data above and below has been reviewed by myself and any further interpretations will be reflected in the assessment and plan.   The data includes review of external notes, and independent interpretation of lab results, x-rays, and imaging reports.  Active inflammatory arthritis is an illness that poses a threat to bodily function and even a threat to life in some cases.  Drug therapy to treat inflammatory arthritis usually requires intensive monitoring for toxicity.    Review of patient's allergies indicates:  No Known Allergies  Assessment/Plan:       Visit prior to Visit prior to Visit prior to Visit prior to Visit prior to last visit:     lab 4/14/21:  SPEP alpha 2 slight high  Serum MARIANNA normal  Ig level normal   HLAB27 neg  TB neg  PRUDENCE neg  +  C3 161  C4 31  Creat 0.6; alb 4.2;  CCP not done  TSH 0.2 low T4 2  CPK 29  WBC 8.2; Hgb 12.3 MCV 94; plt 302  ESR 68; CRP 2.715     records    4/26/21: Rheumatoid arthritis, Dupuytrens, trigger fingers: tendon sheath and finger injections   1/16/20, 3/20/20; 5/21/20,6/30/20, 9/29/20, 12/8/20; 2/23/21: each visit injections in hand joints and or wrist elbow, dequervains etc.     12/18/20: CT chest with: mild ILD predominantly  subpleural lower lungs, small hiatal hernia  7/25/18 MRI left shoulder: partial tears supra/infraspinatus tendons; moderate tendinosis long head biceps tendon; glenohumeral and AC DJD; changes suggestive adhesive capsulitis     Moderate to severe disease activity  Mild to moderate deformities  Mild to Moderately impaired functional status  Fair prognosis     She failed Methotrexate per history but also stopped and felt not helping     Infliximab 3mg/kg 0,2,6 then q 8wks, hold if infection     Resume MTX 7.5mg PO weekly  Folic acid 1mg daily     Resume prednisone 10mg qam prn  We will retry to seek records Dr. Lino but sounds like only one tele visit)     Visit prior to Visit prior to Visit prior to Last visit:     ESR 21; CRP normal     Prior plan for   Infliximab 3mg/kg 0,2,6 then q 8wks, hold if infection but  She rather never got the infusion and didn't think to call us     MTX 7.5mg PO weekly  Folic acid 1mg daily  Prior plan Resume prednisone 10mg qam prn     No longer using celebrex 200mg daily prn as she ran out and we will refill     She is currently on antibiotic from PCP for a cough and should hold the Methotrexate until symptoms resolve     No overt synovitis but she is on prednisone 10mg and the MTX  No overt pneumonia on exam but does have some diminished breath sounds and crackles in base;   her lung exam is unchanged from previous     She has been reluctant to see Pulmonary for the Interstitial Lung disease   as she wanted to work on her rheumatoid arthritis first and   then see Pulmonary which   I explained she should rather do both that will optimize her outcome;   As she needs regular monitoring CT chest PFT's exam etc;    Otherwise it may be confusing if she has symptoms of infection and will potentially put a hold on her treatment for the rheumatoid.     We will seek records from PCP and they will try to keep us in the loop moving forward  We will send today's note to PCP      Dara will look  into getting the infusion set up; the orders were written and sent; maybe they had trouble getting in touch with her or other?     Infliximab 3mg/kg 0,2,6 then q 8wks, hold if infection     Hold Methotrexate for now until symptoms of infection resolves     Resume Celebrex 200mg daily prn     Contact PCP today to see about seeing/getting reestablished with Pulmonary for monitoring;  She should also see Dermatology for regular skin exam given the Actinic sun damage scar including forearms    She will plan to contact us for any problem or question including running out of meds or not getting      Visit prior to Visit prior to Visit prior to Last visit:     ESR 45 CRP<0.3     Infliximab 3mg/kg 0,2,6 then q 8wks, hold if infection     Prior plan Hold Methotrexate for now until symptoms of infection resolves  MTX 7.5mg PO weekly  Folic acid 1mg daily     Celebrex 200mg daily prn     She has been doing much better     She had 2 of the infusions     Some pain weakness in wrists and ankles     She is still using prednisone 10mg qam and she is some leery of decreasing as she is doing so well     She hurt her low back lifting something and went to urgent care and has improved     She uses ibuprofen instead of celebrex but still has celebrex and can use either but not both at same time     No overt synovitis  Appears mostly in remission with mild to moderate deformity  Lungs sound better      Cont inflextra infusions     Increase Methotrexate 15mg PO weekly     Cont folic acid 1mg daily     Cont prednisone 10mg qam for 2 weeks then attempt to decrease to 5mg as tolerated per response;   If doing well on 5mg for weeks more she can attempt to decrease to every other day and/or stop altogether  If problem decreasing can resume prior dose and call us     Revisit 4 months with lab 2 weeks prior     Contact us prn     Visit prior to Visit prior to Last visit:     interim lab:            Lab Results   Component Value Date       12/13/2021     K 4.4 12/13/2021      12/13/2021     CO2 29 12/13/2021     ANIONGAP 8.0 12/13/2021     GLU 98 12/13/2021     CALCIUM 9.0 12/13/2021     BUN 13 12/13/2021     CREATININE 0.73 12/13/2021     PROT 6.6 12/13/2021     AST 26 12/13/2021     ALT 35 12/13/2021     BILITOT 0.6 12/13/2021     ALKPHOS 76 12/13/2021     WBC 8.6 12/13/2021     NEUTROPHILS 69.4 12/13/2021     HGB 12.3 12/13/2021     .9 (H) 12/13/2021     LABPLAT 299 12/13/2021     SEDRATE 20 12/13/2021     WBCUA 0-2 07/07/2021     RBCUA 0-2 07/07/2021     BACTERIA +/- Rare 07/07/2021     PROTEINUA Trace (A) 07/07/2021            ESR 20 CRP<0.2     Infliximab 3mg/kg 0,2,6 then q 8wks, hold if infection     Methotrexate 15mg PO weekly     Cont folic acid 1mg daily     Celebrex 200mg daily prn     Cont prednisone 10mg qam for 2 weeks then attempt to decrease to 5mg as tolerated per response;   If doing well on 5mg for weeks more she can attempt to decrease to every other day and/or stop altogether  If problem decreasing can resume prior dose and call us     She has some interim swelling in hands but she has been doing much better     Some interim vision problems and we will refer to Ophthalmology for further evaluation and also for plaquenil exam     She stopped celebrex as occasional ibuprofen is adequate but I think she is still using intermittent prednisone and I would like to if she can wean that first     She has been back to using prednisone 10mg which I went over tapering plan in future; down to 5mg for weeks and if doing well 5mg qod few weeks and if doing well stop; if not doing well go back to previous dose and contact us     She is more interested in seeing Pulmonary and we will refer and also get PFTs spirometry     No overt synovitis  Appears to be in remission but still needing prednisone  Lungs some crackles left base and some diminished breath sounds but her lung exam sounds better     Increase Methotrexate 20mg PO  weekly  folic acid 1mg daily     Infliximab 3mg/kg 0,2,6 then q 8wks, hold if infection     Prednisone as per above     DEXA to further evaluate     Add Calcium D daily     we will refer to Ophthalmology for further evaluation of vision problems and also for plaquenil exam     We will refer to Pulmonary     PFTs spirometry     Revisit 3 months with lab 2 weeks prior     Contact us prn     Addendum 2/22/22: I received order to rewrite orders for her infusion today and I am going to increase the dose to 4mg/kg with the goal of getting her off prednisone in the future; We can always consider decreasing the dose in the future if doing well         Visit prior to Last visit:     Interim lab:            Lab Results   Component Value Date      03/21/2022     K 4.1 03/21/2022      03/21/2022     CO2 26 03/21/2022     ANIONGAP 9.0 03/21/2022     GLU 96 03/21/2022     CALCIUM 8.5 (L) 03/21/2022     BUN 12 03/21/2022     CREATININE 0.79 03/21/2022     PROT 6.5 03/21/2022     AST 23 03/21/2022     ALT 29 03/21/2022     BILITOT 0.5 03/21/2022     ALKPHOS 88 03/21/2022     WBC 4.5 (L) 03/21/2022     NEUTROPHILS 44.4 03/21/2022     HGB 12.3 03/21/2022     MCV 99.0 (H) 03/21/2022     LABPLAT 266 03/21/2022     SEDRATE 19 03/21/2022     WBCUA 0-2 07/07/2021     RBCUA 0-2 07/07/2021     BACTERIA +/- Rare 07/07/2021     PROTEINUA Trace (A) 07/07/2021         Ca 8.5     ESR 19 CRP<0.2        Interim 2/03/22 DEXA:    total lumbar T-score of -2.2.     right femoral neck T-score of 2.0.    right femur T-score of -2.3.    left femoral neck T-score of -1.9.    total left femur T-score of -2.0.             Interim 2022 PFTs spirometry: Spirometry data is acceptable and reproducible. Patient unable to reach plateau.  Quality grading A-C acceptable. Atleast two acceptable DLCO measurements  within 2 ml/min/mmHg of each other. No post bronchodilator test performed at  this time. Good patient effort.  1) Normal spirometry.  2) Lung  volumes are normal.  3) Mildly reduced DLCO that corrects for alveolar volume.     She has been back to using prednisone 10mg which I went over tapering plan in future; down to 5mg for weeks and if doing well 5mg qod few weeks and if doing well stop; if not doing well go back to previous dose and contact us prev noted     Methotrexate 20mg PO weekly  folic acid 1mg daily     Celebrex 200mg daily prn     Infliximab 4mg/kg q 8wks, hold if infection     Calcium D daily     She has been off of prednisone for months but she has been having a lot of pain in her hands     She also no longer uses celebrex;      uses otc ibuprofen instead that she prefers     She tells me Ophthalmology cleared her for the plaquenil     No overt synovitis but she is having a lot of pain     Verbal education and some written     Went over interim lab and studies     She will consider revisiting ortho for the hand pain     Add plaquenil 200mg daily     Cont other:  Methotrexate 20mg PO weekly  folic acid 1mg daily     Ibuprofen otc she prefers over celebrex     Infliximab 4mg/kg q 8wks, hold if infection     She should increase the Calcium D to bid every other day      She would rather not have further treatment for the osteopenia bordering osteoporosis     Revisit 2-3 months     Call if not improving or question      Contact us prn      Last visit:     Interim lab:        Lab Results   Component Value Date      07/05/2022     K 4.0 07/05/2022      07/05/2022     CO2 31 07/05/2022     ANIONGAP 4.0 07/05/2022     GLU 90 07/05/2022     CALCIUM 9.0 07/05/2022     BUN 12 07/05/2022     CREATININE 0.73 07/05/2022     PROT 7.8 07/05/2022     AST 45 (H) 07/05/2022     ALT 60 07/05/2022     BILITOT 0.5 07/05/2022     ALKPHOS 82 07/05/2022     WBC 6.1 07/05/2022     NEUTROPHILS 51.7 07/05/2022     HGB 13.5 07/05/2022     .2 (H) 07/05/2022     LABPLAT 291 07/05/2022     SEDRATE 13 07/05/2022     WBCUA 0-2 07/07/2021     RBCUA 0-2  07/07/2021     BACTERIA +/- Rare 07/07/2021     PROTEINUA Trace (A) 07/07/2021          alb 3.9     ESR 13 CRP<0.2     2022 TB negative     Prev noted: (She will consider revisiting ortho for the hand pain     Add plaquenil 200mg daily     Cont other:  Methotrexate 20mg PO weekly  folic acid 1mg daily     Ibuprofen otc she prefers over celebrex     Infliximab 4mg/kg q 8wks, hold if infection     She should increase the Calcium D to bid every other day      She would rather not have further treatment for the osteopenia bordering osteoporosis)     She has some interim pain in hands and feet but sounds like doing well     She tells me she is doing very well; rather mostly pain free     She did have some thumb injections with Orthopedics months ago that helped some     She is followed by Pulmonary and tells me that he told her her recent CXR looked good noted     She did see ENT who did not find problem with her vocal chords noted     I suspect her problem singing not as well as she use to is rather more related to the pulmonary disease     No overt synovitis  Bilateral crackles worse on left lower lung unchanged     Cont current  Refill meds        Revisit 4 months with lab 2 weeks prior     Contact us prn    This visit:    Interim lab:  Lab Results   Component Value Date     11/02/2022    K 4.0 11/02/2022     11/02/2022    CO2 31 11/02/2022    ANIONGAP 4.0 11/02/2022    GLU 93 11/02/2022    CALCIUM 9.0 11/02/2022    BUN 12 11/02/2022    CREATININE 0.74 11/02/2022    PROT 7.5 11/02/2022    AST 22 11/02/2022    ALT 24 11/02/2022    BILITOT 0.5 11/02/2022    ALKPHOS 76 11/02/2022    WBC 5.5 11/02/2022    NEUTROPHILS 49.6 11/02/2022    HGB 13.0 11/02/2022    .0 (H) 11/02/2022    LABPLAT 264 11/02/2022    SEDRATE 11 11/02/2022    WBCUA 0-2 07/07/2021    RBCUA 0-2 07/07/2021    BACTERIA +/- Rare 07/07/2021    PROTEINUA Negative 11/02/2022       Hgb 13      ESR 11; CRP 0.7    Prev noted: (((She will  consider revisiting ortho for the hand pain   Add plaquenil 200mg daily   Cont other:  Methotrexate 20mg PO weekly  folic acid 1mg daily   Ibuprofen otc she prefers over celebrex   Infliximab 4mg/kg q 8wks, hold if infection   She should increase the Calcium D to bid every other day    She would rather not have further treatment for the osteopenia bordering osteoporosis)))   She has some interim pain in hands and feet but sounds like doing well   She tells me she is doing very well; rather mostly pain free   She did have some thumb injections with Orthopedics months ago that helped some   She is followed by Pulmonary and tells me that he told her her recent CXR looked good noted   She did see ENT who did not find problem with her vocal chords noted   I suspect her problem singing not as well as she use to is rather more related to the pulmonary disease   No overt synovitis  Bilateral crackles worse on left lower lung unchanged   Cont current  Refill meds)     She has some interim hand pain but rather clarifies she has been doing very well    Has not needed prednisone    She swung golf club some interim and can  now    No overt synovitis  Bilateral crackles lower lung fields      She does have room to increase the frequency of her infusion if needed but she is rather doing well    She remains not interested in further treatment for the Osteoporosis given the interim imaging findings old compression fracture    She is not interested in Shingles vaccine in year or so noted; she does not get flu vaccine; she did get Covid vaccine and booster    She wants Dermatology evaluation for skin exam; rash on face; we will refer to Dermatology for skin exam    Will cont current/refill above    Revisit lab 2 weeks prior    Contact us prn     + CCP 41.08+ PRUDENCE neg [id number] neg uric acid 5.2 HCV neg HepBsAg negsAb neg coreAb neg  Rheumatoid arthritis     2022 TB negative     initial visit noted:  (Pain and swelling including  "hands and feet  Initially working with Dr. Alvarez getting "11 injections" including finger joints wrist;   also was injected for trigger fingers  She had a lot of relief from the injections including in other painful areas of her body  Most recent injections were 3 weeks ago  She notes that her joint swelling and stiffness is not as bad today; attributes to steroids  Also getting oral prednisone and medrol going back to 5/21/19 per records from PCP Dr. Linn; also CCP41+ 5/22/20  No overt synovitis; DJD generalized including hands)     12/18/20: CT chest with: mild ILD predominantly subpleural lower lungs, small hiatal hernia  7/25/18 MRI left shoulder: partial tears spinatus tendons; moderate tendinosis long head biceps tendon; glenohumeral and AC DJD; changes suggestive adhesive capsulitis     2/03/22 DEXA:    total lumbar T-score of -2.2.     right femoral neck T-score of 2.0.    right femur T-score of -2.3.    left femoral neck T-score of -1.9.    total left femur T-score of -2.0.         She would rather not have further treatment for the osteopenia bordering osteoporosis prev noted    She also has prior compression fractures on spine on her HRCT chest above noted     2022 PFTs spirometry: Spirometry data is acceptable and reproducible. Patient unable to reach plateau.  Quality grading A-C acceptable. Atleast two acceptable DLCO measurements  within 2 ml/min/mmHg of each other. No post bronchodilator test performed at  this time. Good patient effort.  1) Normal spirometry.  2) Lung volumes are normal.  3) Mildly reduced DLCO that corrects for alveolar volume.     Prev noted:(She has been reluctant to see Pulmonary for the Interstitial Lung disease   as she wanted to work on her rheumatoid arthritis first and   then see Pulmonary which   I explained she should rather do both that will optimize her outcome;   As she needs regular monitoring CT chest PFT's exam etc;    Otherwise it may be confusing if she has " "symptoms of infection and will potentially put a hold on her treatment for the rheumatoid.)     2021 TB negative     Prior left shoulder injection     Had seen Dr. Lino and given MTX for 3 months that "did not help at all"  Has also been working  records    4/26/21: Rheumatoid arthritis, Dupuytrens, trigger fingers: tendon sheath and finger injections   1/16/20, 3/20/20; 5/21/20,6/30/20, 9/29/20, 12/8/20; 2/23/21: each visit injections in hand joints and or wrist elbow, dequervains etc.     Has been managing with     Prior mobic     Prior oral prednisone  Steroid injections as per records  We refilled celebrex 200mg daily prn prev noted      Prev and again noted: (Rosacea on face with some Moon facies  Actinic sun damage scar including forearms   she should have dermatology evaluation at her earliest convenience)     Review of medical records as stated above.  Lab +/- imaging and other ordered diagnostic studies to further evaluate.  See the orders associated with this note visit.  Medications as prescribed as tolerated.    Education including verbal and those noted in the patient instructions.  Revisit as scheduled and call prn.    The following diagnoses influence medical decision making and/or need further workup including the orders listed below:    Rheumatoid arthritis involving multiple sites with positive rheumatoid factor  -     CBC Auto Differential; Future; Expected date: 03/06/2023  -     CK; Future; Expected date: 03/06/2023  -     Comprehensive Metabolic Panel; Future; Expected date: 03/06/2023  -     C-Reactive Protein; Future; Expected date: 03/06/2023  -     Sedimentation rate; Future; Expected date: 03/06/2023  -     Urinalysis; Future; Expected date: 03/06/2023  -     hydrOXYchloroQUINE (PLAQUENIL) 200 mg tablet; Take 1 tablet (200 mg total) by mouth once daily.  Dispense: 30 tablet; Refill: 4  -     methotrexate 2.5 MG Tab; Take 8 tablets (20 mg total) by mouth every 7 days.  Dispense: 32 " tablet; Refill: 4  -     folic acid (FOLVITE) 1 MG tablet; Take 1 tablet (1 mg total) by mouth once daily.  Dispense: 30 tablet; Refill: 6    Interstitial lung disease  -     CBC Auto Differential; Future; Expected date: 03/06/2023  -     CK; Future; Expected date: 03/06/2023  -     Comprehensive Metabolic Panel; Future; Expected date: 03/06/2023  -     C-Reactive Protein; Future; Expected date: 03/06/2023  -     Sedimentation rate; Future; Expected date: 03/06/2023  -     Urinalysis; Future; Expected date: 03/06/2023    Other osteoarthritis involving multiple joints  -     CBC Auto Differential; Future; Expected date: 03/06/2023  -     CK; Future; Expected date: 03/06/2023  -     Comprehensive Metabolic Panel; Future; Expected date: 03/06/2023  -     C-Reactive Protein; Future; Expected date: 03/06/2023  -     Sedimentation rate; Future; Expected date: 03/06/2023  -     Urinalysis; Future; Expected date: 03/06/2023    High risk medication use  -     CBC Auto Differential; Future; Expected date: 03/06/2023  -     CK; Future; Expected date: 03/06/2023  -     Comprehensive Metabolic Panel; Future; Expected date: 03/06/2023  -     C-Reactive Protein; Future; Expected date: 03/06/2023  -     Sedimentation rate; Future; Expected date: 03/06/2023  -     Urinalysis; Future; Expected date: 03/06/2023  -     methotrexate 2.5 MG Tab; Take 8 tablets (20 mg total) by mouth every 7 days.  Dispense: 32 tablet; Refill: 4  -     folic acid (FOLVITE) 1 MG tablet; Take 1 tablet (1 mg total) by mouth once daily.  Dispense: 30 tablet; Refill: 6    Rotator cuff dysfunction, left  -     CBC Auto Differential; Future; Expected date: 03/06/2023  -     CK; Future; Expected date: 03/06/2023  -     Comprehensive Metabolic Panel; Future; Expected date: 03/06/2023  -     C-Reactive Protein; Future; Expected date: 03/06/2023  -     Sedimentation rate; Future; Expected date: 03/06/2023  -     Urinalysis; Future; Expected date:  03/06/2023    Medication monitoring encounter  -     CBC Auto Differential; Future; Expected date: 03/06/2023  -     CK; Future; Expected date: 03/06/2023  -     Comprehensive Metabolic Panel; Future; Expected date: 03/06/2023  -     C-Reactive Protein; Future; Expected date: 03/06/2023  -     Sedimentation rate; Future; Expected date: 03/06/2023  -     Urinalysis; Future; Expected date: 03/06/2023    Osteopenia of multiple sites  -     CBC Auto Differential; Future; Expected date: 03/06/2023  -     CK; Future; Expected date: 03/06/2023  -     Comprehensive Metabolic Panel; Future; Expected date: 03/06/2023  -     C-Reactive Protein; Future; Expected date: 03/06/2023  -     Sedimentation rate; Future; Expected date: 03/06/2023  -     Urinalysis; Future; Expected date: 03/06/2023  -     calcium carbonate-vitamin D3 (CALCIUM 500 + D) 500 mg-10 mcg (400 unit) Tab; Take 1 tablet by mouth 2 (two) times a day.  Dispense: 60 each; Refill: 4    Rash in adult  -     Ambulatory referral/consult to Dermatology; Future; Expected date: 11/28/2022    Follow up in about 4 months (around 3/21/2023).     Leonard Shelton MD

## 2022-11-21 ENCOUNTER — OFFICE VISIT (OUTPATIENT)
Dept: RHEUMATOLOGY | Facility: CLINIC | Age: 72
End: 2022-11-21
Payer: MEDICARE

## 2022-11-21 VITALS
HEART RATE: 83 BPM | BODY MASS INDEX: 23.4 KG/M2 | DIASTOLIC BLOOD PRESSURE: 67 MMHG | RESPIRATION RATE: 16 BRPM | SYSTOLIC BLOOD PRESSURE: 131 MMHG | HEIGHT: 67 IN | OXYGEN SATURATION: 96 % | WEIGHT: 149.13 LBS

## 2022-11-21 DIAGNOSIS — M15.8 OTHER OSTEOARTHRITIS INVOLVING MULTIPLE JOINTS: ICD-10-CM

## 2022-11-21 DIAGNOSIS — M05.79 RHEUMATOID ARTHRITIS INVOLVING MULTIPLE SITES WITH POSITIVE RHEUMATOID FACTOR: Primary | ICD-10-CM

## 2022-11-21 DIAGNOSIS — R21 RASH IN ADULT: ICD-10-CM

## 2022-11-21 DIAGNOSIS — M67.912 ROTATOR CUFF DYSFUNCTION, LEFT: ICD-10-CM

## 2022-11-21 DIAGNOSIS — Z79.899 HIGH RISK MEDICATION USE: ICD-10-CM

## 2022-11-21 DIAGNOSIS — M85.89 OSTEOPENIA OF MULTIPLE SITES: ICD-10-CM

## 2022-11-21 DIAGNOSIS — Z51.81 MEDICATION MONITORING ENCOUNTER: ICD-10-CM

## 2022-11-21 DIAGNOSIS — J84.9 INTERSTITIAL LUNG DISEASE: ICD-10-CM

## 2022-11-21 PROCEDURE — 99214 OFFICE O/P EST MOD 30 MIN: CPT | Mod: S$GLB,,, | Performed by: INTERNAL MEDICINE

## 2022-11-21 PROCEDURE — 99214 PR OFFICE/OUTPT VISIT, EST, LEVL IV, 30-39 MIN: ICD-10-PCS | Mod: S$GLB,,, | Performed by: INTERNAL MEDICINE

## 2022-11-21 RX ORDER — FOLIC ACID 1 MG/1
1 TABLET ORAL DAILY
Qty: 30 TABLET | Refills: 6 | Status: SHIPPED | OUTPATIENT
Start: 2022-11-21 | End: 2023-01-01 | Stop reason: SDUPTHER

## 2022-11-21 RX ORDER — HYDROXYCHLOROQUINE SULFATE 200 MG/1
200 TABLET, FILM COATED ORAL DAILY
Qty: 30 TABLET | Refills: 4 | Status: SHIPPED | OUTPATIENT
Start: 2022-11-21 | End: 2023-01-01 | Stop reason: SDUPTHER

## 2022-11-21 RX ORDER — LEVOTHYROXINE SODIUM 100 UG/1
100 TABLET ORAL DAILY
COMMUNITY
Start: 2022-11-18

## 2022-11-21 RX ORDER — PNV NO.95/FERROUS FUM/FOLIC AC 28MG-0.8MG
1 TABLET ORAL 2 TIMES DAILY
Qty: 60 EACH | Refills: 4 | Status: SHIPPED | OUTPATIENT
Start: 2022-11-21 | End: 2023-01-01 | Stop reason: SDUPTHER

## 2022-11-21 RX ORDER — METHOTREXATE 2.5 MG/1
20 TABLET ORAL
Qty: 32 TABLET | Refills: 4 | Status: SHIPPED | OUTPATIENT
Start: 2022-11-21 | End: 2023-01-01 | Stop reason: SDUPTHER

## 2023-01-01 ENCOUNTER — CLINICAL SUPPORT (OUTPATIENT)
Dept: PULMONOLOGY | Facility: CLINIC | Age: 73
End: 2023-01-01
Payer: MEDICARE

## 2023-01-01 ENCOUNTER — OFFICE VISIT (OUTPATIENT)
Dept: RHEUMATOLOGY | Facility: CLINIC | Age: 73
End: 2023-01-01
Payer: MEDICARE

## 2023-01-01 ENCOUNTER — TELEPHONE (OUTPATIENT)
Dept: RHEUMATOLOGY | Facility: CLINIC | Age: 73
End: 2023-01-01
Payer: MEDICARE

## 2023-01-01 ENCOUNTER — OFFICE VISIT (OUTPATIENT)
Dept: PULMONOLOGY | Facility: CLINIC | Age: 73
End: 2023-01-01
Payer: MEDICARE

## 2023-01-01 ENCOUNTER — HOSPITAL ENCOUNTER (OUTPATIENT)
Dept: RADIOLOGY | Facility: CLINIC | Age: 73
Discharge: HOME | End: 2023-01-12
Attending: INTERNAL MEDICINE
Payer: MEDICARE

## 2023-01-01 VITALS
WEIGHT: 147 LBS | HEART RATE: 70 BPM | OXYGEN SATURATION: 97 % | DIASTOLIC BLOOD PRESSURE: 79 MMHG | BODY MASS INDEX: 23.07 KG/M2 | SYSTOLIC BLOOD PRESSURE: 134 MMHG | HEIGHT: 67 IN | RESPIRATION RATE: 17 BRPM

## 2023-01-01 VITALS
BODY MASS INDEX: 23.39 KG/M2 | DIASTOLIC BLOOD PRESSURE: 78 MMHG | RESPIRATION RATE: 16 BRPM | SYSTOLIC BLOOD PRESSURE: 129 MMHG | HEIGHT: 67 IN | WEIGHT: 149.06 LBS | HEART RATE: 79 BPM | OXYGEN SATURATION: 98 %

## 2023-01-01 VITALS
RESPIRATION RATE: 16 BRPM | SYSTOLIC BLOOD PRESSURE: 130 MMHG | WEIGHT: 148.13 LBS | BODY MASS INDEX: 23.25 KG/M2 | HEIGHT: 67 IN | DIASTOLIC BLOOD PRESSURE: 72 MMHG | HEART RATE: 82 BPM | OXYGEN SATURATION: 99 %

## 2023-01-01 DIAGNOSIS — M05.79 RHEUMATOID ARTHRITIS INVOLVING MULTIPLE SITES WITH POSITIVE RHEUMATOID FACTOR: Primary | ICD-10-CM

## 2023-01-01 DIAGNOSIS — J84.89 INTERSTITIAL LUNG DISEASE DUE TO CONNECTIVE TISSUE DISEASE: ICD-10-CM

## 2023-01-01 DIAGNOSIS — M35.9 INTERSTITIAL LUNG DISEASE DUE TO CONNECTIVE TISSUE DISEASE: ICD-10-CM

## 2023-01-01 DIAGNOSIS — J84.9 ILD (INTERSTITIAL LUNG DISEASE): ICD-10-CM

## 2023-01-01 DIAGNOSIS — M67.912 ROTATOR CUFF DYSFUNCTION, LEFT: ICD-10-CM

## 2023-01-01 DIAGNOSIS — Z51.81 MEDICATION MONITORING ENCOUNTER: ICD-10-CM

## 2023-01-01 DIAGNOSIS — J84.89 INTERSTITIAL LUNG DISEASE DUE TO CONNECTIVE TISSUE DISEASE: Primary | ICD-10-CM

## 2023-01-01 DIAGNOSIS — J84.9 ILD (INTERSTITIAL LUNG DISEASE): Primary | ICD-10-CM

## 2023-01-01 DIAGNOSIS — M35.9 INTERSTITIAL LUNG DISEASE DUE TO CONNECTIVE TISSUE DISEASE: Primary | ICD-10-CM

## 2023-01-01 DIAGNOSIS — M85.89 OSTEOPENIA OF MULTIPLE SITES: ICD-10-CM

## 2023-01-01 DIAGNOSIS — J84.9 INTERSTITIAL LUNG DISEASE: ICD-10-CM

## 2023-01-01 DIAGNOSIS — M15.8 OTHER OSTEOARTHRITIS INVOLVING MULTIPLE JOINTS: ICD-10-CM

## 2023-01-01 DIAGNOSIS — Z79.899 HIGH RISK MEDICATION USE: ICD-10-CM

## 2023-01-01 LAB
ALBUMIN SERPL BCP-MCNC: 4.2 G/DL (ref 3.4–5)
ALBUMIN/GLOBULIN RATIO: 1.5 RATIO (ref 1.1–1.8)
ALP SERPL-CCNC: 87 U/L (ref 46–116)
ALT SERPL W P-5'-P-CCNC: 34 U/L (ref 12–78)
ANION GAP SERPL CALC-SCNC: 8 MMOL/L (ref 3–11)
APPEARANCE, UA: CLEAR
AST SERPL-CCNC: 34 U/L (ref 15–37)
BASOPHILS NFR BLD: 1.6 % (ref 0–3)
BILIRUB SERPL-MCNC: 0.7 MG/DL (ref 0–1)
BILIRUB UR QL STRIP: NEGATIVE MG/DL
BUN SERPL-MCNC: 14 MG/DL (ref 7–18)
BUN/CREAT SERPL: 20 RATIO (ref 7–18)
CALCIUM SERPL-MCNC: 9.2 MG/DL (ref 8.8–10.5)
CHLORIDE SERPL-SCNC: 104 MMOL/L (ref 100–108)
CO2 SERPL-SCNC: 29 MMOL/L (ref 21–32)
COLOR UR: ABNORMAL
CREAT SERPL-MCNC: 0.7 MG/DL (ref 0.55–1.02)
CREATINE KINASE, SERUM: 65 U/L (ref 26–308)
CRP QUANTITATIVE: < 0.2 MG/DL (ref 0–0.9)
EOSINOPHIL NFR BLD: 3.1 % (ref 1–3)
ERYTHROCYTE [DISTWIDTH] IN BLOOD BY AUTOMATED COUNT: 13.8 % (ref 12.5–18)
ERYTHROCYTE [SEDIMENTATION RATE] IN BLOOD: 15 MM/HR (ref 0–20)
GFR ESTIMATION: > 60
GLOBULIN: 2.8 G/DL (ref 2.3–3.5)
GLUCOSE (UA): NORMAL MG/DL
GLUCOSE SERPL-MCNC: 98 MG/DL (ref 70–110)
HCT VFR BLD AUTO: 39.1 % (ref 37–47)
HGB BLD-MCNC: 13 G/DL (ref 12–16)
HGB UR QL STRIP: NEGATIVE /UL
KETONES UR QL STRIP: NEGATIVE MG/DL
LEUKOCYTE ESTERASE UR QL STRIP: NEGATIVE /UL
LYMPHOCYTES NFR BLD: 40.4 % (ref 25–40)
MCH RBC QN AUTO: 32.5 PG (ref 27–31.2)
MCHC RBC AUTO-ENTMCNC: 33.2 G/DL (ref 31.8–35.4)
MCV RBC AUTO: 97.8 FL (ref 80–97)
MONOCYTES NFR BLD: 7.2 % (ref 1–15)
NEUTROPHILS # BLD AUTO: 2.3 10*3/UL (ref 1.8–7.7)
NEUTROPHILS NFR BLD: 47.5 % (ref 37–80)
NITRITE UR QL STRIP: NEGATIVE
NUCLEATED RED BLOOD CELLS: 0 %
PH UR STRIP: 6.5 PH (ref 5–9)
PLATELETS: 244 10*3/UL (ref 142–424)
POTASSIUM SERPL-SCNC: 4 MMOL/L (ref 3.6–5.2)
PROT SERPL-MCNC: 7 G/DL (ref 6.4–8.2)
PROT UR QL STRIP: NEGATIVE MG/DL
RBC # BLD AUTO: 4 10*6/UL (ref 4.2–5.4)
SODIUM BLD-SCNC: 141 MMOL/L (ref 135–145)
SP GR UR STRIP: 1.02 (ref 1–1.03)
SPECIMEN COLLECTION METHOD, URINE: ABNORMAL
UROBILINOGEN UR STRIP-ACNC: NORMAL MG/DL
WBC # BLD: 4.9 10*3/UL (ref 4.6–10.2)

## 2023-01-01 PROCEDURE — 71046 XR CHEST PA AND LATERAL: ICD-10-PCS | Mod: 26,,, | Performed by: RADIOLOGY

## 2023-01-01 PROCEDURE — 94010 BREATHING CAPACITY TEST: ICD-10-PCS | Mod: S$GLB,,, | Performed by: INTERNAL MEDICINE

## 2023-01-01 PROCEDURE — 94729 DIFFUSING CAPACITY: CPT | Mod: S$GLB,,, | Performed by: INTERNAL MEDICINE

## 2023-01-01 PROCEDURE — 94729 PR C02/MEMBANE DIFFUSE CAPACITY: ICD-10-PCS | Mod: S$GLB,,, | Performed by: INTERNAL MEDICINE

## 2023-01-01 PROCEDURE — 94726 PULM FUNCT TST PLETHYSMOGRAP: ICD-10-PCS | Mod: S$GLB,,, | Performed by: INTERNAL MEDICINE

## 2023-01-01 PROCEDURE — 99214 OFFICE O/P EST MOD 30 MIN: CPT | Mod: 25,S$GLB,,

## 2023-01-01 PROCEDURE — 94010 BREATHING CAPACITY TEST: CPT | Mod: S$GLB,,, | Performed by: INTERNAL MEDICINE

## 2023-01-01 PROCEDURE — 99214 PR OFFICE/OUTPT VISIT, EST, LEVL IV, 30-39 MIN: ICD-10-PCS | Mod: S$GLB,,, | Performed by: INTERNAL MEDICINE

## 2023-01-01 PROCEDURE — 99214 OFFICE O/P EST MOD 30 MIN: CPT | Mod: 25,S$GLB,, | Performed by: INTERNAL MEDICINE

## 2023-01-01 PROCEDURE — 71046 X-RAY EXAM CHEST 2 VIEWS: CPT | Mod: 26,,, | Performed by: RADIOLOGY

## 2023-01-01 PROCEDURE — 99214 PR OFFICE/OUTPT VISIT, EST, LEVL IV, 30-39 MIN: ICD-10-PCS | Mod: 25,S$GLB,,

## 2023-01-01 PROCEDURE — 99214 PR OFFICE/OUTPT VISIT, EST, LEVL IV, 30-39 MIN: ICD-10-PCS | Mod: 25,S$GLB,, | Performed by: INTERNAL MEDICINE

## 2023-01-01 PROCEDURE — 71046 X-RAY EXAM CHEST 2 VIEWS: CPT | Mod: TC,,, | Performed by: INTERNAL MEDICINE

## 2023-01-01 PROCEDURE — 99214 OFFICE O/P EST MOD 30 MIN: CPT | Mod: S$GLB,,, | Performed by: INTERNAL MEDICINE

## 2023-01-01 PROCEDURE — 94726 PLETHYSMOGRAPHY LUNG VOLUMES: CPT | Mod: S$GLB,,, | Performed by: INTERNAL MEDICINE

## 2023-01-01 PROCEDURE — 71046 XR CHEST PA AND LATERAL: ICD-10-PCS | Mod: TC,,, | Performed by: INTERNAL MEDICINE

## 2023-01-01 RX ORDER — HYDROXYCHLOROQUINE SULFATE 200 MG/1
200 TABLET, FILM COATED ORAL DAILY
Qty: 30 TABLET | Refills: 4 | Status: SHIPPED | OUTPATIENT
Start: 2023-01-01

## 2023-01-01 RX ORDER — METHOTREXATE 2.5 MG/1
20 TABLET ORAL
Qty: 32 TABLET | Refills: 4 | Status: SHIPPED | OUTPATIENT
Start: 2023-01-01

## 2023-01-01 RX ORDER — FOLIC ACID 1 MG/1
1 TABLET ORAL DAILY
Qty: 30 TABLET | Refills: 6 | Status: SHIPPED | OUTPATIENT
Start: 2023-01-01

## 2023-01-01 RX ORDER — PNV NO.95/FERROUS FUM/FOLIC AC 28MG-0.8MG
1 TABLET ORAL 2 TIMES DAILY
Qty: 60 EACH | Refills: 4 | Status: SHIPPED | OUTPATIENT
Start: 2023-01-01

## 2023-01-12 NOTE — PROGRESS NOTES
Subjective:    Patient Identification:   Patient ID: Alysa Abernathy is a 72 y.o. female.    Referring Provider:  Established Patient    Chief Complaint:  ILD (interstitual lung disease)      History of Present Illness:    Alysa Abernathy is a 72 y.o. female who presents to follow-up on rheumatoid arthritis related interstitial lung disease.    She has been stable in terms of her respiratory symptoms and actually a little better.  She can not sing better.  She takes Mucinex at nighttime which really helped her.  She is maintained on infliximab infusions along with Plaquenil and methotrexate.  She has been off steroids since August and has done good.  She gets a little bit of swelling in her hands at night but the pain is much better.  She has mild occasional dry cough.  Chest x-ray from this morning was personally reviewed and is stable without any acute changes on top of chronic interstitial lung disease changes.  PFTs from this morning are stable to mildly improved.  There was no evidence of restriction on today's PFTs.  Diffusion capacity is also slightly improved at 60% predicted compared to 54% on PFTs from July 2022    Review of Systems:  Review of Systems   Constitutional:  Negative for fever, chills, weight loss, weight gain, activity change, appetite change, fatigue, night sweats and weakness.   HENT:  Negative for nosebleeds, postnasal drip, rhinorrhea, sinus pressure, sore throat, trouble swallowing, voice change, congestion, ear pain and hearing loss.    Eyes:  Negative for redness and itching.   Respiratory:  Negative for cough, hemoptysis, sputum production, choking, chest tightness, shortness of breath, wheezing, orthopnea, previous hospitialization due to pulmonary problems, asthma nighttime symptoms, pleurisy, dyspnea on extertion, use of rescue inhaler and Paroxysmal Nocturnal Dyspnea.    Cardiovascular:  Negative for chest pain, palpitations and leg swelling.   Genitourinary:  Negative for  difficulty urinating and hematuria.   Endocrine:  Negative for polydipsia, polyphagia, cold intolerance, heat intolerance and polyuria.    Musculoskeletal:  Positive for joint swelling. Negative for arthralgias, back pain, gait problem and myalgias.   Skin:  Negative for rash.   Gastrointestinal:  Negative for nausea, vomiting, abdominal pain, abdominal distention and acid reflux.   Neurological:  Negative for dizziness, syncope, weakness, light-headedness and headaches.   Hematological:  Negative for adenopathy. Does not bruise/bleed easily and no excessive bruising.   Psychiatric/Behavioral:  Negative for confusion and sleep disturbance. The patient is not nervous/anxious.        Allergies: Review of patient's allergies indicates:  No Known Allergies    Medications:      Past Medical History:      Past Medical History:   Diagnosis Date    Arthritis     Cataract     Dry mouth     Thyroid disease        Family History:      Family History   Problem Relation Age of Onset    Hypertension Mother     Hyperlipidemia Father     Cancer Sister         Social History:      Past Surgical History:   Procedure Laterality Date    EYE SURGERY      HYSTERECTOMY      TONSILLECTOMY         Physical Exam:  Vitals:    01/12/23 0839   BP: 129/78   Pulse: 79   Resp: 16     Physical Exam   Constitutional: She is oriented to person, place, and time. She appears not cachectic. No distress.   HENT:   Head: Normocephalic.   Right Ear: External ear normal.   Left Ear: External ear normal.   Nose: Nose normal. No mucosal edema. No polyps.   Mouth/Throat: Oropharynx is clear and moist. Normal dentition. No oropharyngeal exudate.   Neck: No JVD present. No tracheal deviation present. No thyromegaly present.   Cardiovascular: Normal rate, regular rhythm, normal heart sounds and intact distal pulses. Exam reveals no gallop and no friction rub.   No murmur heard.  Pulmonary/Chest: Normal expansion, symmetric chest wall expansion and effort normal.  No stridor. No respiratory distress. She has no decreased breath sounds. She has no wheezes. She has no rhonchi. She has no rales. Chest wall is not dull to percussion. She exhibits no tenderness.   Left posterior fine basilar crackles more than right basilar crackles Negative for egophony. Negative for tactile fremitus.   Abdominal: Soft. Bowel sounds are normal. She exhibits no distension and no mass. There is no hepatosplenomegaly. There is no abdominal tenderness. There is no rebound and no guarding. No hernia.   Musculoskeletal:         General: No tenderness, deformity or edema. Normal range of motion.      Cervical back: Normal range of motion and neck supple.   Lymphadenopathy: No supraclavicular adenopathy is present.     She has no cervical adenopathy.     She has no axillary adenopathy.   Neurological: She is alert and oriented to person, place, and time. She has normal reflexes. She displays normal reflexes. No cranial nerve deficit. She exhibits normal muscle tone.   Skin: Skin is warm and dry. No rash noted. She is not diaphoretic. No cyanosis or erythema. No pallor. Nails show no clubbing.   Psychiatric: She has a normal mood and affect. Her behavior is normal. Judgment and thought content normal.                 Accessory Clinical Data:  Chest x-ray:  Stable chronic interstitial lung disease changes.    CT scan:     PFTs:  PFTs from this morning with FEV1 of 101% predicted, FVC of 87% predicted with a ratio of 89.  TLC is 81% predicted and vital capacity is 94% predicted.  Diffusion capacity is 60% predicted.  Overall no obstruction or restriction on today's PFTs with mildly reduced diffusion capacity with slight improvement from PFTs from 07/14/2022.    6MWT:     TTE:    Lab data:    All radiographic imaging of the chest, PFT tracings/data, and 6MWT data have been independently reviewed and interpreted unless otherwise specified.     Assessment and Plan:        Problem List Items Addressed This  Visit    None  Visit Diagnoses       Interstitial lung disease due to connective tissue disease    -  Primary           Stable interstitial lung disease.  Continue management of rheumatoid arthritis as per Dr. Shelton.      No need for frequent interval chest imaging unless clinically indicated.  If respiratory complaints worsen, will need high-resolution CT chest.  At risk of opportunistic infections while being immune compromised due to DMARDs.  We will continue to follow up clinically with PFTs every 6 months.      Follow up in about 6 months (around 7/12/2023) for With PFTs.     This note is dictated on M*Modal word recognition program.  There are word recognition mistakes that are occasionally missed on review.

## 2023-03-20 NOTE — PROGRESS NOTES
Subjective:      Patient ID: Alysa Abernathy is a 72 y.o. female.    Chief Complaint: Disease Management    HPI:   Includes joint pain with subjective swelling.   Antecedent event includes: None;  Pain location includes: hands;  Gradual onset; beginning >years ago;  Constant ache, Moderate in severity,   Lasting >hours, Worse at all times;   Improved with rest, medication, change in position, and none;   Worsened with activity and overuse;         Review of Systems   Constitutional:  Negative for fever and unexpected weight change.   HENT:  Negative for mouth dryness, mouth sores and trouble swallowing.    Eyes:  Positive for eye dryness. Negative for visual disturbance.   Respiratory:  Negative for cough and shortness of breath.    Cardiovascular:  Negative for chest pain.   Gastrointestinal:  Negative for constipation and diarrhea.   Genitourinary:  Negative for dysuria, genital sores and nocturia.   Musculoskeletal:  Positive for arthralgias and joint swelling.   Integumentary:  Negative for rash.   Neurological:  Positive for headaches.   Hematological:  Does not bruise/bleed easily.   Psychiatric/Behavioral:  Negative for dysphoric mood and sleep disturbance. The patient is not nervous/anxious.     Past Medical History:   Diagnosis Date    Arthritis     Cataract     Dry mouth     Thyroid disease      Past Surgical History:   Procedure Laterality Date    EYE SURGERY      HYSTERECTOMY      TONSILLECTOMY        See the Assessment/Plan for further characterization of the HPI, ROS, Medical, Surgical, Family, and Social Histories including Tobacco use, Meds; all of which has been reviewed in Epic.    Medication List with Changes/Refills   Current Medications    LEVOTHYROXINE (SYNTHROID) 100 MCG TABLET    Take 100 mcg by mouth once daily.    PREDNISONE (DELTASONE) 10 MG TABLET    Take 1 tablet (10 mg total) by mouth daily as needed (pain with swelling).   Changed and/or Refilled Medications    Modified Medication  "Previous Medication    CALCIUM CARBONATE-VITAMIN D3 (CALCIUM 500 + D) 500 MG-10 MCG (400 UNIT) TAB calcium carbonate-vitamin D3 (CALCIUM 500 + D) 500 mg-10 mcg (400 unit) Tab       Take 1 tablet by mouth 2 (two) times a day.    Take 1 tablet by mouth 2 (two) times a day.    FOLIC ACID (FOLVITE) 1 MG TABLET folic acid (FOLVITE) 1 MG tablet       Take 1 tablet (1 mg total) by mouth once daily.    Take 1 tablet (1 mg total) by mouth once daily.    HYDROXYCHLOROQUINE (PLAQUENIL) 200 MG TABLET hydrOXYchloroQUINE (PLAQUENIL) 200 mg tablet       Take 1 tablet (200 mg total) by mouth once daily.    Take 1 tablet (200 mg total) by mouth once daily.    METHOTREXATE 2.5 MG TAB methotrexate 2.5 MG Tab       Take 8 tablets (20 mg total) by mouth every 7 days.    Take 8 tablets (20 mg total) by mouth every 7 days.         Objective:   /72   Pulse 82   Resp 16   Ht 5' 7" (1.702 m)   Wt 67.2 kg (148 lb 1.6 oz)   SpO2 99%   BMI 23.20 kg/m²   Physical Exam  Non-toxic appearance. No distress.   Normocephalic and atraumatic.   External ears normal.    Conjunctivae and EOM are normal. No scleral icterus.   RRR, No friction rub; palpable distal pulses.    No tachypnea or signs of respiratory distress.   Abdominal: No guarding or rebound tenderness.   Neurological: Oriented. Normal thought content.   Skin is warm. No pallor.   Musculoskeletal:  see below for further input.     X-Ray Chest PA And Lateral  Narrative: EXAM:  XR CHEST PA AND LATERAL    CLINICAL HISTORY:  Interstitial pulmonary disease.    FINDINGS:  The heart is normal in size.  Diffuse increase in reticular nodular interstitial markings throughout both lung fields.  Suspect underlying emphysematous change.  No acute infiltrate or consolidation.  Impression:  Increased interstitial markings throughout both lung fields with probable underlying emphysema.    Finalized on: 1/12/2023 9:12 AM By:  Jared Gregory MD  [facility]# [id number]      2023-01-12 09:14:[id number]    " [facility]    Orders Only on 03/14/2023   Component Date Value Ref Range Status    Sodium 03/14/2023 141  135 - 145 mmol/L Final    Potassium 03/14/2023 4.0  3.6 - 5.2 mmol/L Final    Chloride 03/14/2023 104  100 - 108 mmol/L Final    CO2 03/14/2023 29  21 - 32 mmol/L Final    Anion Gap 03/14/2023 8.0  3.0 - 11.0 mmol/L Final    BUN 03/14/2023 14  7 - 18 mg/dL Final    Creatinine 03/14/2023 0.70  0.55 - 1.02 mg/dL Final    GFR ESTIMATION 03/14/2023 > 60  >60 Final               DESCRIPTION       GLOMERULAR FILTRATION RATE             NORMAL                     >60             KIDNEY  DISEASE           15-60             KIDNEY FAILURE             <15    BUN/Creatinine Ratio 03/14/2023 20.00 (H)  7 - 18 Ratio Final    Glucose 03/14/2023 98  70 - 110 mg/dL Final    Calcium 03/14/2023 9.2  8.8 - 10.5 mg/dL Final    Total Bilirubin 03/14/2023 0.7  0.0 - 1.0 mg/dL Final    AST 03/14/2023 34  15 - 37 U/L Final    ALT 03/14/2023 34  12 - 78 U/L Final    Total Protein 03/14/2023 7.0  6.4 - 8.2 g/dL Final    Albumin 03/14/2023 4.2  3.4 - 5.0 g/dL Final    Globulin 03/14/2023 2.8  2.3 - 3.5 g/dL Final    Albumin/Globulin Ratio 03/14/2023 1.500  1.1 - 1.8 Ratio Final    Alkaline Phosphatase 03/14/2023 87  46 - 116 U/L Final    CK, Serum 03/14/2023 65  26 - 308 U/L Final    CRP QUANTITATIVE 03/14/2023 < 0.2  0.0 - 0.9 mg/dL Final   Orders Only on 03/14/2023   Component Date Value Ref Range Status    WBC 03/14/2023 4.9  4.6 - 10.2 10*3/uL Final    RBC 03/14/2023 4.00 (L)  4.2 - 5.4 10*6/uL Final    Hemoglobin 03/14/2023 13.0  12.0 - 16.0 g/dL Final    Hematocrit 03/14/2023 39.1  37.0 - 47.0 % Final    MCV 03/14/2023 97.8 (H)  80 - 97 fL Final    MCH 03/14/2023 32.5 (H)  27.0 - 31.2 pg Final    MCHC 03/14/2023 33.2  31.8 - 35.4 g/dL Final    RDW RBC Auto-Rto 03/14/2023 13.8  12.5 - 18.0 % Final    Platelets 03/14/2023 244  142 - 424 10*3/uL Final    Neutrophils 03/14/2023 47.5  37 - 80 % Final    Lymphocytes 03/14/2023 40.4 (H)  25 - 40  % Final    Monocytes 03/14/2023 7.2  1 - 15 % Final    Eosinophils 03/14/2023 3.1 (H)  1 - 3 % Final    Basophils 03/14/2023 1.6  0 - 3 % Final    nRBC# 03/14/2023 0.0  % Final    Neutrophils Absolute 03/14/2023 2.30  1.8 - 7.7 10*3/uL Final    Sed Rate 03/14/2023 15  0 - 20 mm/hr Final   Orders Only on 03/14/2023   Component Date Value Ref Range Status    Specimen Collection Method 03/14/2023 Void   Final    Color, UA 03/14/2023 Danae (A)  Yellow Final    Appearance, UA 03/14/2023 Clear  Clear Final    pH, UA 03/14/2023 6.5  5.0 - 9.0 pH Final    Specific Gravity, UA 03/14/2023 1.020  1.000 - 1.030 Final    Protein, UA 03/14/2023 Negative  Negative mg/dL Final    Glucose, UA 03/14/2023 Normal  Normal mg/dL Final    Ketones, UA 03/14/2023 Negative  Negative mg/dL Final    Occult Blood UA 03/14/2023 Negative  Negative /uL Final    Nitrite, UA 03/14/2023 Negative  Negative Final    Bilirubin (UA) 03/14/2023 Negative  Negative mg/dL Final    Urobilinogen, UA 03/14/2023 Normal  Normal mg/dL Final    Leukocytes, UA 03/14/2023 Negative  Negative /uL Final   Orders Only on 11/02/2022   Component Date Value Ref Range Status    WBC 11/02/2022 5.5  4.6 - 10.2 10*3/uL Final    RBC 11/02/2022 3.99 (L)  4.2 - 5.4 10*6/uL Final    Hemoglobin 11/02/2022 13.0  12.0 - 16.0 g/dL Final    Hematocrit 11/02/2022 40.3  37.0 - 47.0 % Final    MCV 11/02/2022 101.0 (H)  80 - 97 fL Final    MCH 11/02/2022 32.6 (H)  27.0 - 31.2 pg Final    MCHC 11/02/2022 32.3  31.8 - 35.4 g/dL Final    RDW RBC Auto-Rto 11/02/2022 15.0  12.5 - 18.0 % Final    Platelets 11/02/2022 264  142 - 424 10*3/uL Final    Neutrophils 11/02/2022 49.6  37 - 80 % Final    Lymphocytes 11/02/2022 33.3  25 - 40 % Final    Monocytes 11/02/2022 11.4  1 - 15 % Final    Eosinophils 11/02/2022 4.0 (H)  1 - 3 % Final    Basophils 11/02/2022 1.3  0 - 3 % Final    nRBC# 11/02/2022 0.0  % Final    Neutrophils Absolute 11/02/2022 2.74  1.8 - 7.7 10*3/uL Final    Macrocytes 11/02/2022  1+   Final    Sed Rate 11/02/2022 11  0 - 20 mm/hr Final   Orders Only on 11/02/2022   Component Date Value Ref Range Status    Sodium 11/02/2022 138  135 - 145 mmol/L Final    Potassium 11/02/2022 4.0  3.6 - 5.2 mmol/L Final    Chloride 11/02/2022 103  100 - 108 mmol/L Final    CO2 11/02/2022 31  21 - 32 mmol/L Final    Anion Gap 11/02/2022 4.0  3.0 - 11.0 mmol/L Final    BUN 11/02/2022 12  7 - 18 mg/dL Final    Creatinine 11/02/2022 0.74  0.55 - 1.02 mg/dL Final    GFR ESTIMATION 11/02/2022 > 60  >60 Final               DESCRIPTION       GLOMERULAR FILTRATION RATE             NORMAL                     >60             KIDNEY  DISEASE           15-60             KIDNEY FAILURE             <15    BUN/Creatinine Ratio 11/02/2022 16.21  7 - 18 Ratio Final    Glucose 11/02/2022 93  70 - 110 mg/dL Final    Calcium 11/02/2022 9.0  8.8 - 10.5 mg/dL Final    Total Bilirubin 11/02/2022 0.5  0.0 - 1.0 mg/dL Final    AST 11/02/2022 22  15 - 37 U/L Final    ALT 11/02/2022 24  12 - 78 U/L Final    Total Protein 11/02/2022 7.5  6.4 - 8.2 g/dL Final    Albumin 11/02/2022 3.9  3.4 - 5.0 g/dL Final    Globulin 11/02/2022 3.6 (H)  2.3 - 3.5 g/dL Final    Albumin/Globulin Ratio 11/02/2022 1.083 (L)  1.1 - 1.8 Ratio Final    Alkaline Phosphatase 11/02/2022 76  46 - 116 U/L Final    CK, Serum 11/02/2022 38  26 - 308 U/L Final    CRP QUANTITATIVE 11/02/2022 0.7  0.0 - 0.9 mg/dL Final   Orders Only on 11/02/2022   Component Date Value Ref Range Status    Specimen Collection Method 11/02/2022 Void   Final    Color, UA 11/02/2022 Danae (A)  Yellow Final    Appearance, UA 11/02/2022 Clear  Clear Final    pH, UA 11/02/2022 6.5  5.0 - 9.0 pH Final    Specific Gravity, UA 11/02/2022 1.015  1.000 - 1.030 Final    Protein, UA 11/02/2022 Negative  Negative mg/dL Final    Glucose, UA 11/02/2022 Normal  Normal mg/dL Final    Ketones, UA 11/02/2022 Negative  Negative mg/dL Final    Occult Blood UA 11/02/2022 Negative  Negative /uL Final    Nitrite,  UA 11/02/2022 Negative  Negative Final    Bilirubin (UA) 11/02/2022 Negative  Negative mg/dL Final    Urobilinogen, UA 11/02/2022 Normal  Normal mg/dL Final    Leukocytes, UA 11/02/2022 Negative  Negative /uL Final   Orders Only on 07/05/2022   Component Date Value Ref Range Status    QuantiFERON-TB Gold Plus 07/05/2022 NEGATIVE  Negative Final    Comment: Interpretive Data: Quantiferon TB Gold PlusInterferon gamma release is measured for specimens from each ofthe four collection tubes. A qualitative result (Negative,Positive, or Indeterminate) is based on interpretation of thefour values, NIL, MITOGEN   minus NIL (MITOGEN-NIL), TB1 minus NIL(TB1-NIL), and TB2 minus NIL (TB2-NIL). The NIL value representsnonspecific reactivity produced by the patient specimen. TheMITOGEN-NIL value serves as the positive control for the patientspecimen, demonstrating   successful lymphocyte activity. TheTB1-NIL tube specifically detects CD4+ lymphocyte reactivity,specifically stimulated by the TB1 antigens. The TB2-NIL tubedetects both CD4+ and CD8+ lymphocyte reactivity, stimulated byTB2 antigens. An overall Negative   result does not completelyrule out TB infection.A false-positive result in the absence of other clinicalevidence of TB infection is not uncommon. Refer to: UpdatedGuidelines for Using Interferon Gamma Release Assays to DetectMycobacterium                            tuberculosis   Infection --- [place], 2010(http://www.cdc.gov/mmwr/preview/mmwrhtml/xw8140c3.htm), formore information concerning test performance in low-prevalencepopulations and use in occupational screening.      QuantiFERON TB1 NIL 07/05/2022 0.01  0.00 - 0.34 IU/mL Final    QuantiFERON TB2 NIL 07/05/2022 0.01  0.00 - 0.34 IU/mL Final    QuantiFERON Mitogen Minus NIL 07/05/2022 7.93  IU/mL Final    QuantiFERON TB NIL 07/05/2022 0.01  IU/mL Final    Performed By: [id number] [place] [id number] Director: Ken  MAYANK Dash MD, PhD   Orders Only on 07/05/2022   Component Date Value Ref Range Status    WBC 07/05/2022 6.1  4.6 - 10.2 10*3/uL Final    RBC 07/05/2022 4.24  4.2 - 5.4 10*6/uL Final    Hemoglobin 07/05/2022 13.5  12.0 - 16.0 g/dL Final    Hematocrit 07/05/2022 42.5  37.0 - 47.0 % Final    MCV 07/05/2022 100.2 (H)  80 - 97 fL Final    MCH 07/05/2022 31.8 (H)  27.0 - 31.2 pg Final    MCHC 07/05/2022 31.8  31.8 - 35.4 g/dL Final    RDW RBC Auto-Rto 07/05/2022 15.9  12.5 - 18.0 % Final    Platelets 07/05/2022 291  142 - 424 10*3/uL Final    Neutrophils 07/05/2022 51.7  37 - 80 % Final    Lymphocytes 07/05/2022 36.8  25 - 40 % Final    Monocytes 07/05/2022 6.8  1 - 15 % Final    Eosinophils 07/05/2022 2.9  1 - 3 % Final    Basophils 07/05/2022 1.5  0 - 3 % Final    nRBC# 07/05/2022 0.0  % Final    Neutrophils Absolute 07/05/2022 3.17  1.8 - 7.7 10*3/uL Final    Macrocytes 07/05/2022 1+   Final    Sed Rate 07/05/2022 13  0 - 20 mm/hr Final   Orders Only on 07/05/2022   Component Date Value Ref Range Status    Sodium 07/05/2022 137  135 - 145 mmol/L Final    Potassium 07/05/2022 4.0  3.6 - 5.2 mmol/L Final    Chloride 07/05/2022 102  100 - 108 mmol/L Final    CO2 07/05/2022 31  21 - 32 mmol/L Final    Anion Gap 07/05/2022 4.0  3.0 - 11.0 mmol/L Final    BUN 07/05/2022 12  7 - 18 mg/dL Final    Creatinine 07/05/2022 0.73  0.55 - 1.02 mg/dL Final    GFR ESTIMATION 07/05/2022 > 60  >60 Final               DESCRIPTION       GLOMERULAR FILTRATION RATE             NORMAL                     >60             KIDNEY  DISEASE           15-60             KIDNEY FAILURE             <15    BUN/Creatinine Ratio 07/05/2022 16.43  7 - 18 Ratio Final    Glucose 07/05/2022 90  70 - 110 mg/dL Final    Calcium 07/05/2022 9.0  8.8 - 10.5 mg/dL Final    Total Bilirubin 07/05/2022 0.5  0.0 - 1.0 mg/dL Final    AST 07/05/2022 45 (H)  15 - 37 U/L Final    ALT 07/05/2022 60  12 - 78 U/L Final    Total Protein 07/05/2022 7.8  6.4 - 8.2 g/dL Final     Albumin 07/05/2022 4.3  3.4 - 5.0 g/dL Final    Globulin 07/05/2022 3.5  2.3 - 3.5 g/dL Final    Albumin/Globulin Ratio 07/05/2022 1.228  1.1 - 1.8 Ratio Final    Alkaline Phosphatase 07/05/2022 82  46 - 116 U/L Final    CK, Serum 07/05/2022 41  26 - 308 U/L Final    CRP QUANTITATIVE 07/05/2022 < 0.2  0.0 - 0.9 mg/dL Final        All of the data above and below has been reviewed by myself and any further interpretations will be reflected in the assessment and plan.   The data includes review of external notes, and independent interpretation of lab results, x-rays, and imaging reports.  Active inflammatory arthritis is an illness that poses a threat to bodily function and even a threat to life in some cases.  Drug therapy to treat inflammatory arthritis usually requires intensive monitoring for toxicity.    Review of patient's allergies indicates:  No Known Allergies  Assessment/Plan:          Visit prior to Visit prior to Visit prior to Visit prior to Visit prior to Visit prior to last visit:     lab 4/14/21:  SPEP alpha 2 slight high  Serum MARIANNA normal  Ig level normal   HLAB27 neg  TB neg  PRUDENCE neg  +  C3 161  C4 31  Creat 0.6; alb 4.2;  CCP not done  TSH 0.2 low T4 2  CPK 29  WBC 8.2; Hgb 12.3 MCV 94; plt 302  ESR 68; CRP 2.715     records    4/26/21: Rheumatoid arthritis, Dupuytrens, trigger fingers: tendon sheath and finger injections   1/16/20, 3/20/20; 5/21/20,6/30/20, 9/29/20, 12/8/20; 2/23/21: each visit injections in hand joints and or wrist elbow, dequervains etc.     12/18/20: CT chest with: mild ILD predominantly subpleural lower lungs, small hiatal hernia  7/25/18 MRI left shoulder: partial tears supra/infraspinatus tendons; moderate tendinosis long head biceps tendon; glenohumeral and AC DJD; changes suggestive adhesive capsulitis     Moderate to severe disease activity  Mild to moderate deformities  Mild to Moderately impaired functional status  Fair prognosis     She failed Methotrexate  per history but also stopped and felt not helping     Infliximab 3mg/kg 0,2,6 then q 8wks, hold if infection     Resume MTX 7.5mg PO weekly  Folic acid 1mg daily     Resume prednisone 10mg qam prn  We will retry to seek records Dr. Lino but sounds like only one tele visit)     Visit prior to Visit prior to Visit prior to Visit prior to Last visit:     ESR 21; CRP normal     Prior plan for   Infliximab 3mg/kg 0,2,6 then q 8wks, hold if infection but  She rather never got the infusion and didn't think to call us     MTX 7.5mg PO weekly  Folic acid 1mg daily  Prior plan Resume prednisone 10mg qam prn     No longer using celebrex 200mg daily prn as she ran out and we will refill     She is currently on antibiotic from PCP for a cough and should hold the Methotrexate until symptoms resolve     No overt synovitis but she is on prednisone 10mg and the MTX  No overt pneumonia on exam but does have some diminished breath sounds and crackles in base;   her lung exam is unchanged from previous     She has been reluctant to see Pulmonary for the Interstitial Lung disease   as she wanted to work on her rheumatoid arthritis first and   then see Pulmonary which   I explained she should rather do both that will optimize her outcome;   As she needs regular monitoring CT chest PFT's exam etc;    Otherwise it may be confusing if she has symptoms of infection and will potentially put a hold on her treatment for the rheumatoid.     We will seek records from PCP and they will try to keep us in the loop moving forward  We will send today's note to PCP      Dara will look into getting the infusion set up; the orders were written and sent; maybe they had trouble getting in touch with her or other?     Infliximab 3mg/kg 0,2,6 then q 8wks, hold if infection     Hold Methotrexate for now until symptoms of infection resolves     Resume Celebrex 200mg daily prn     Contact PCP today to see about seeing/getting reestablished with Pulmonary  for monitoring;  She should also see Dermatology for regular skin exam given the Actinic sun damage scar including forearms    She will plan to contact us for any problem or question including running out of meds or not getting      Visit prior to Visit prior to Visit prior to Visit prior to Last visit:     ESR 45 CRP<0.3     Infliximab 3mg/kg 0,2,6 then q 8wks, hold if infection     Prior plan Hold Methotrexate for now until symptoms of infection resolves  MTX 7.5mg PO weekly  Folic acid 1mg daily     Celebrex 200mg daily prn     She has been doing much better     She had 2 of the infusions     Some pain weakness in wrists and ankles     She is still using prednisone 10mg qam and she is some leery of decreasing as she is doing so well     She hurt her low back lifting something and went to urgent care and has improved     She uses ibuprofen instead of celebrex but still has celebrex and can use either but not both at same time     No overt synovitis  Appears mostly in remission with mild to moderate deformity  Lungs sound better      Cont inflextra infusions     Increase Methotrexate 15mg PO weekly     Cont folic acid 1mg daily     Cont prednisone 10mg qam for 2 weeks then attempt to decrease to 5mg as tolerated per response;   If doing well on 5mg for weeks more she can attempt to decrease to every other day and/or stop altogether  If problem decreasing can resume prior dose and call us     Revisit 4 months with lab 2 weeks prior     Contact us prn     Visit prior to Visit prior to Visit prior to Last visit:     interim lab:            Lab Results   Component Value Date      12/13/2021     K 4.4 12/13/2021      12/13/2021     CO2 29 12/13/2021     ANIONGAP 8.0 12/13/2021     GLU 98 12/13/2021     CALCIUM 9.0 12/13/2021     BUN 13 12/13/2021     CREATININE 0.73 12/13/2021     PROT 6.6 12/13/2021     AST 26 12/13/2021     ALT 35 12/13/2021     BILITOT 0.6 12/13/2021     ALKPHOS 76 12/13/2021     WBC 8.6  12/13/2021     NEUTROPHILS 69.4 12/13/2021     HGB 12.3 12/13/2021     .9 (H) 12/13/2021     LABPLAT 299 12/13/2021     SEDRATE 20 12/13/2021     WBCUA 0-2 07/07/2021     RBCUA 0-2 07/07/2021     BACTERIA +/- Rare 07/07/2021     PROTEINUA Trace (A) 07/07/2021            ESR 20 CRP<0.2     Infliximab 3mg/kg 0,2,6 then q 8wks, hold if infection     Methotrexate 15mg PO weekly     Cont folic acid 1mg daily     Celebrex 200mg daily prn     Cont prednisone 10mg qam for 2 weeks then attempt to decrease to 5mg as tolerated per response;   If doing well on 5mg for weeks more she can attempt to decrease to every other day and/or stop altogether  If problem decreasing can resume prior dose and call us     She has some interim swelling in hands but she has been doing much better     Some interim vision problems and we will refer to Ophthalmology for further evaluation and also for plaquenil exam     She stopped celebrex as occasional ibuprofen is adequate but I think she is still using intermittent prednisone and I would like to if she can wean that first     She has been back to using prednisone 10mg which I went over tapering plan in future; down to 5mg for weeks and if doing well 5mg qod few weeks and if doing well stop; if not doing well go back to previous dose and contact us     She is more interested in seeing Pulmonary and we will refer and also get PFTs spirometry     No overt synovitis  Appears to be in remission but still needing prednisone  Lungs some crackles left base and some diminished breath sounds but her lung exam sounds better     Increase Methotrexate 20mg PO weekly  folic acid 1mg daily     Infliximab 3mg/kg 0,2,6 then q 8wks, hold if infection     Prednisone as per above     DEXA to further evaluate     Add Calcium D daily     we will refer to Ophthalmology for further evaluation of vision problems and also for plaquenil exam     We will refer to Pulmonary     PFTs spirometry     Revisit 3 months  with lab 2 weeks prior     Contact us prn     Addendum 2/22/22: I received order to rewrite orders for her infusion today and I am going to increase the dose to 4mg/kg with the goal of getting her off prednisone in the future; We can always consider decreasing the dose in the future if doing well         Visit prior to Visit prior to Last visit:     Interim lab:            Lab Results   Component Value Date      03/21/2022     K 4.1 03/21/2022      03/21/2022     CO2 26 03/21/2022     ANIONGAP 9.0 03/21/2022     GLU 96 03/21/2022     CALCIUM 8.5 (L) 03/21/2022     BUN 12 03/21/2022     CREATININE 0.79 03/21/2022     PROT 6.5 03/21/2022     AST 23 03/21/2022     ALT 29 03/21/2022     BILITOT 0.5 03/21/2022     ALKPHOS 88 03/21/2022     WBC 4.5 (L) 03/21/2022     NEUTROPHILS 44.4 03/21/2022     HGB 12.3 03/21/2022     MCV 99.0 (H) 03/21/2022     LABPLAT 266 03/21/2022     SEDRATE 19 03/21/2022     WBCUA 0-2 07/07/2021     RBCUA 0-2 07/07/2021     BACTERIA +/- Rare 07/07/2021     PROTEINUA Trace (A) 07/07/2021         Ca 8.5     ESR 19 CRP<0.2        Interim 2/03/22 DEXA:    total lumbar T-score of -2.2.     right femoral neck T-score of 2.0.    right femur T-score of -2.3.    left femoral neck T-score of -1.9.    total left femur T-score of -2.0.             Interim 2022 PFTs spirometry: Spirometry data is acceptable and reproducible. Patient unable to reach plateau.  Quality grading A-C acceptable. Atleast two acceptable DLCO measurements  within 2 ml/min/mmHg of each other. No post bronchodilator test performed at  this time. Good patient effort.  1) Normal spirometry.  2) Lung volumes are normal.  3) Mildly reduced DLCO that corrects for alveolar volume.     She has been back to using prednisone 10mg which I went over tapering plan in future; down to 5mg for weeks and if doing well 5mg qod few weeks and if doing well stop; if not doing well go back to previous dose and contact us prev noted      Methotrexate 20mg PO weekly  folic acid 1mg daily     Celebrex 200mg daily prn     Infliximab 4mg/kg q 8wks, hold if infection     Calcium D daily     She has been off of prednisone for months but she has been having a lot of pain in her hands     She also no longer uses celebrex;      uses otc ibuprofen instead that she prefers     She tells me Ophthalmology cleared her for the plaquenil     No overt synovitis but she is having a lot of pain     Verbal education and some written     Went over interim lab and studies     She will consider revisiting ortho for the hand pain     Add plaquenil 200mg daily     Cont other:  Methotrexate 20mg PO weekly  folic acid 1mg daily     Ibuprofen otc she prefers over celebrex     Infliximab 4mg/kg q 8wks, hold if infection     She should increase the Calcium D to bid every other day      She would rather not have further treatment for the osteopenia bordering osteoporosis     Revisit 2-3 months     Call if not improving or question      Contact us prn      Visit prior to Last visit:     Interim lab:            Lab Results   Component Value Date      07/05/2022     K 4.0 07/05/2022      07/05/2022     CO2 31 07/05/2022     ANIONGAP 4.0 07/05/2022     GLU 90 07/05/2022     CALCIUM 9.0 07/05/2022     BUN 12 07/05/2022     CREATININE 0.73 07/05/2022     PROT 7.8 07/05/2022     AST 45 (H) 07/05/2022     ALT 60 07/05/2022     BILITOT 0.5 07/05/2022     ALKPHOS 82 07/05/2022     WBC 6.1 07/05/2022     NEUTROPHILS 51.7 07/05/2022     HGB 13.5 07/05/2022     .2 (H) 07/05/2022     LABPLAT 291 07/05/2022     SEDRATE 13 07/05/2022     WBCUA 0-2 07/07/2021     RBCUA 0-2 07/07/2021     BACTERIA +/- Rare 07/07/2021     PROTEINUA Trace (A) 07/07/2021          alb 3.9     ESR 13 CRP<0.2     2022 TB negative     Prev noted: (She will consider revisiting ortho for the hand pain     Add plaquenil 200mg daily     Cont other:  Methotrexate 20mg PO weekly  folic acid 1mg daily      Ibuprofen otc she prefers over celebrex     Infliximab 4mg/kg q 8wks, hold if infection     She should increase the Calcium D to bid every other day      She would rather not have further treatment for the osteopenia bordering osteoporosis)     She has some interim pain in hands and feet but sounds like doing well     She tells me she is doing very well; rather mostly pain free     She did have some thumb injections with Orthopedics months ago that helped some     She is followed by Pulmonary and tells me that he told her her recent CXR looked good noted     She did see ENT who did not find problem with her vocal chords noted     I suspect her problem singing not as well as she use to is rather more related to the pulmonary disease     No overt synovitis  Bilateral crackles worse on left lower lung unchanged     Cont current  Refill meds        Revisit 4 months with lab 2 weeks prior     Contact us prn     Last visit:     Interim lab:        Lab Results   Component Value Date      11/02/2022     K 4.0 11/02/2022      11/02/2022     CO2 31 11/02/2022     ANIONGAP 4.0 11/02/2022     GLU 93 11/02/2022     CALCIUM 9.0 11/02/2022     BUN 12 11/02/2022     CREATININE 0.74 11/02/2022     PROT 7.5 11/02/2022     AST 22 11/02/2022     ALT 24 11/02/2022     BILITOT 0.5 11/02/2022     ALKPHOS 76 11/02/2022     WBC 5.5 11/02/2022     NEUTROPHILS 49.6 11/02/2022     HGB 13.0 11/02/2022     .0 (H) 11/02/2022     LABPLAT 264 11/02/2022     SEDRATE 11 11/02/2022     WBCUA 0-2 07/07/2021     RBCUA 0-2 07/07/2021     BACTERIA +/- Rare 07/07/2021     PROTEINUA Negative 11/02/2022         Hgb 13        ESR 11; CRP 0.7     Prev noted: (((She will consider revisiting ortho for the hand pain   Add plaquenil 200mg daily   Cont other:  Methotrexate 20mg PO weekly  folic acid 1mg daily   Ibuprofen otc she prefers over celebrex   Infliximab 4mg/kg q 8wks, hold if infection   She should increase the Calcium D to bid every  other day    She would rather not have further treatment for the osteopenia bordering osteoporosis)))   She has some interim pain in hands and feet but sounds like doing well   She tells me she is doing very well; rather mostly pain free   She did have some thumb injections with Orthopedics months ago that helped some   She is followed by Pulmonary and tells me that he told her her recent CXR looked good noted   She did see ENT who did not find problem with her vocal chords noted   I suspect her problem singing not as well as she use to is rather more related to the pulmonary disease   No overt synovitis  Bilateral crackles worse on left lower lung unchanged   Cont current  Refill meds)     She has some interim hand pain but rather clarifies she has been doing very well     Has not needed prednisone     She swung golf club some interim and can  now     No overt synovitis  Bilateral crackles lower lung fields        She does have room to increase the frequency of her infusion if needed but she is rather doing well     She remains not interested in further treatment for the Osteoporosis given the interim imaging findings old compression fracture     She is not interested in Shingles vaccine in year or so noted; she does not get flu vaccine; she did get Covid vaccine and booster     She wants Dermatology evaluation for skin exam; rash on face; we will refer to Dermatology for skin exam     Will cont current/refill above     Revisit lab 2 weeks prior     Contact us prn    This visit:    Interim lab:  Lab Results   Component Value Date     03/14/2023    K 4.0 03/14/2023     03/14/2023    CO2 29 03/14/2023    ANIONGAP 8.0 03/14/2023    GLU 98 03/14/2023    CALCIUM 9.2 03/14/2023    BUN 14 03/14/2023    CREATININE 0.70 03/14/2023    PROT 7.0 03/14/2023    AST 34 03/14/2023    ALT 34 03/14/2023    BILITOT 0.7 03/14/2023    ALKPHOS 87 03/14/2023    WBC 4.9 03/14/2023    NEUTROPHILS 47.5 03/14/2023    HGB 13.0  03/14/2023    MCV 97.8 (H) 03/14/2023    LABPLAT 244 03/14/2023    SEDRATE 15 03/14/2023    WBCUA 0-2 07/07/2021    RBCUA 0-2 07/07/2021    BACTERIA +/- Rare 07/07/2021    PROTEINUA Negative 03/14/2023           ESR 15 CRP<0.2    Prev noted: (((She will consider revisiting ortho for the hand pain   Add plaquenil 200mg daily   Cont other:  Methotrexate 20mg PO weekly  folic acid 1mg daily   Ibuprofen otc she prefers over celebrex   Infliximab 4mg/kg q 8wks, hold if infection   She should increase the Calcium D to bid every other day    She would rather not have further treatment for the osteopenia bordering osteoporosis)))   She has some interim pain in hands and feet but sounds like doing well   She tells me she is doing very well; rather mostly pain free   She did have some thumb injections with Orthopedics months ago that helped some   She is followed by Pulmonary and tells me that he told her her recent CXR looked good noted   She did see ENT who did not find problem with her vocal chords noted   I suspect her problem singing not as well as she use to is rather more related to the pulmonary disease   No overt synovitis  Bilateral crackles worse on left lower lung unchanged   Cont current  Refill meds)    Prev noted/prior plan:   (She has some interim hand pain but rather clarifies she has been doing very well     Has not needed prednisone     She swung golf club some interim and can  now     No overt synovitis  Bilateral crackles lower lung fields      She does have room to increase the frequency of her infusion if needed but she is rather doing well     She remains not interested in further treatment for the Osteoporosis given the interim imaging findings old compression fracture     She is not interested in Shingles vaccine in year or so noted; she does not get flu vaccine; she did get Covid vaccine and booster     She wants Dermatology evaluation for skin exam; rash on face; we will refer to Dermatology  "for skin exam     Will cont current/refill above)    She has been doing worse interim with some symptoms of inflammatory arthritis in hands but rather clarifies doing fairly well    She uses rare ibuprofen 800mg when over use with hands in garden for example    She has been  twice and they are no longer living    Lives alone; Brother is sometimes around can help her; granddaughter is with her doing some computer schooling but will be moving back with mother in future    She still has some renovation on her damaged home getting help from the city; to have floors and ceiling repaired etc.    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 but keeping it up    Cares for bees honey and to resume as unable to do recent years hands and brother is to help some before he leaves    She also has dog shitzu type found in woods that is a character     No overt synovitis  Bibasilar crackles sounds better on right     Verbal education    Went over lab    We can increase the dose or frequency of of her infusion depending on the nature of her symptoms but she is rather doing well  Infliximab 4mg/kg q 8wks, hold if infection     plaquenil 200mg daily   Methotrexate 20mg PO weekly  folic acid 1mg daily   Ibuprofen otc she prefers over celebrex prev noted  She should increase the Calcium D to bid every other day prev noted   She would rather not have further treatment for the osteopenia bordering osteoporosis prev noted      Revisit lab 2 weeks prior     Contact us prn       + CCP 41.08+ PRUDENCE neg [id number] neg uric acid 5.2 HCV neg HepBsAg negsAb neg coreAb neg  Rheumatoid arthritis     2022 TB negative     initial visit noted:  (Pain and swelling including hands and feet  Initially working with Dr. Alvarez getting "11 injections" including finger joints wrist;   also was injected for trigger fingers  She had a lot of relief from the " injections including in other painful areas of her body  Most recent injections were 3 weeks ago  She notes that her joint swelling and stiffness is not as bad today; attributes to steroids  Also getting oral prednisone and medrol going back to 5/21/19 per records from PCP Dr. Linn; also CCP41+ 5/22/20  No overt synovitis; DJD generalized including hands)     12/18/20: CT chest with: mild ILD predominantly subpleural lower lungs, small hiatal hernia  7/25/18 MRI left shoulder: partial tears spinatus tendons; moderate tendinosis long head biceps tendon; glenohumeral and AC DJD; changes suggestive adhesive capsulitis     2/03/22 DEXA:    total lumbar T-score of -2.2.     right femoral neck T-score of 2.0.    right femur T-score of -2.3.    left femoral neck T-score of -1.9.    total left femur T-score of -2.0.         She would rather not have further treatment for the osteopenia bordering osteoporosis prev noted     She also has prior compression fractures on spine on her HRCT chest above noted     2022 PFTs spirometry: Spirometry data is acceptable and reproducible. Patient unable to reach plateau.  Quality grading A-C acceptable. Atleast two acceptable DLCO measurements  within 2 ml/min/mmHg of each other. No post bronchodilator test performed at  this time. Good patient effort.  1) Normal spirometry.  2) Lung volumes are normal.  3) Mildly reduced DLCO that corrects for alveolar volume.     Prev noted:(She has been reluctant to see Pulmonary for the Interstitial Lung disease   as she wanted to work on her rheumatoid arthritis first and   then see Pulmonary which   I explained she should rather do both that will optimize her outcome;   As she needs regular monitoring CT chest PFT's exam etc;    Otherwise it may be confusing if she has symptoms of infection and will potentially put a hold on her treatment for the rheumatoid.)     2021 TB negative     Prior left shoulder injection     Had seen Dr. Lino and given  "MTX for 3 months that "did not help at all"  Has also been working  records    4/26/21: Rheumatoid arthritis, Dupuytrens, trigger fingers: tendon sheath and finger injections   1/16/20, 3/20/20; 5/21/20,6/30/20, 9/29/20, 12/8/20; 2/23/21: each visit injections in hand joints and or wrist elbow, dequervains etc.     Has been managing with     Prior mobic     Prior oral prednisone  Steroid injections as per records  We refilled celebrex 200mg daily prn prev noted      Prev and again noted: (Rosacea on face with some Moon facies  Actinic sun damage scar including forearms   she should have dermatology evaluation at her earliest convenience)     Review of medical records as stated above.  Lab +/- imaging and other ordered diagnostic studies to further evaluate.  See the orders associated with this note visit.  Medications as prescribed as tolerated.     Education including verbal and those noted in the patient instructions.  Revisit as scheduled and call prn.  Review of medical records as stated above.  Lab +/- imaging and other ordered diagnostic studies to further evaluate.  See the orders associated with this note visit.  Medications as prescribed as tolerated.    Education including verbal and those noted in the patient instructions.  Revisit as scheduled and call prn.    The following diagnoses influence medical decision making and/or need further workup including the orders listed below:    Rheumatoid arthritis involving multiple sites with positive rheumatoid factor  -     CBC Auto Differential; Future; Expected date: 07/14/2023  -     CK; Future; Expected date: 07/14/2023  -     Comprehensive Metabolic Panel; Future; Expected date: 07/14/2023  -     C-Reactive Protein; Future; Expected date: 07/14/2023  -     Sedimentation rate; Future; Expected date: 07/14/2023  -     Urinalysis; Future; Expected date: 07/14/2023  -     folic acid (FOLVITE) 1 MG tablet; Take 1 tablet (1 mg total) by mouth once daily.  " Dispense: 30 tablet; Refill: 6  -     hydrOXYchloroQUINE (PLAQUENIL) 200 mg tablet; Take 1 tablet (200 mg total) by mouth once daily.  Dispense: 30 tablet; Refill: 4  -     methotrexate 2.5 MG Tab; Take 8 tablets (20 mg total) by mouth every 7 days.  Dispense: 32 tablet; Refill: 4    Interstitial lung disease  -     CBC Auto Differential; Future; Expected date: 07/14/2023  -     CK; Future; Expected date: 07/14/2023  -     Comprehensive Metabolic Panel; Future; Expected date: 07/14/2023  -     C-Reactive Protein; Future; Expected date: 07/14/2023  -     Sedimentation rate; Future; Expected date: 07/14/2023  -     Urinalysis; Future; Expected date: 07/14/2023    Other osteoarthritis involving multiple joints  -     CBC Auto Differential; Future; Expected date: 07/14/2023  -     CK; Future; Expected date: 07/14/2023  -     Comprehensive Metabolic Panel; Future; Expected date: 07/14/2023  -     C-Reactive Protein; Future; Expected date: 07/14/2023  -     Sedimentation rate; Future; Expected date: 07/14/2023  -     Urinalysis; Future; Expected date: 07/14/2023    Rotator cuff dysfunction, left  -     CBC Auto Differential; Future; Expected date: 07/14/2023  -     CK; Future; Expected date: 07/14/2023  -     Comprehensive Metabolic Panel; Future; Expected date: 07/14/2023  -     C-Reactive Protein; Future; Expected date: 07/14/2023  -     Sedimentation rate; Future; Expected date: 07/14/2023  -     Urinalysis; Future; Expected date: 07/14/2023    High risk medication use  -     CBC Auto Differential; Future; Expected date: 07/14/2023  -     CK; Future; Expected date: 07/14/2023  -     Comprehensive Metabolic Panel; Future; Expected date: 07/14/2023  -     C-Reactive Protein; Future; Expected date: 07/14/2023  -     Sedimentation rate; Future; Expected date: 07/14/2023  -     Urinalysis; Future; Expected date: 07/14/2023  -     folic acid (FOLVITE) 1 MG tablet; Take 1 tablet (1 mg total) by mouth once daily.  Dispense: 30  tablet; Refill: 6  -     methotrexate 2.5 MG Tab; Take 8 tablets (20 mg total) by mouth every 7 days.  Dispense: 32 tablet; Refill: 4    Medication monitoring encounter  -     CBC Auto Differential; Future; Expected date: 07/14/2023  -     CK; Future; Expected date: 07/14/2023  -     Comprehensive Metabolic Panel; Future; Expected date: 07/14/2023  -     C-Reactive Protein; Future; Expected date: 07/14/2023  -     Sedimentation rate; Future; Expected date: 07/14/2023  -     Urinalysis; Future; Expected date: 07/14/2023    Osteopenia of multiple sites  -     CBC Auto Differential; Future; Expected date: 07/14/2023  -     CK; Future; Expected date: 07/14/2023  -     Comprehensive Metabolic Panel; Future; Expected date: 07/14/2023  -     C-Reactive Protein; Future; Expected date: 07/14/2023  -     Sedimentation rate; Future; Expected date: 07/14/2023  -     Urinalysis; Future; Expected date: 07/14/2023  -     calcium carbonate-vitamin D3 (CALCIUM 500 + D) 500 mg-10 mcg (400 unit) Tab; Take 1 tablet by mouth 2 (two) times a day.  Dispense: 60 each; Refill: 4      Follow up in about 4 months (around 7/29/2023).     Leonard Shelton MD

## 2023-05-15 NOTE — TELEPHONE ENCOUNTER
Patient called to let us know that she went to the dentist for cleaning and regular visit and her jaws started locking up on her, she called the dentist and they made her apt for tomorrow and if she need to see Dr. Shelton she will call us back to make apt and let him know what the dentist said.

## 2023-07-19 PROBLEM — J84.89 INTERSTITIAL LUNG DISEASE DUE TO CONNECTIVE TISSUE DISEASE: Status: ACTIVE | Noted: 2023-01-01

## 2023-07-19 PROBLEM — M35.9 INTERSTITIAL LUNG DISEASE DUE TO CONNECTIVE TISSUE DISEASE: Status: ACTIVE | Noted: 2023-01-01

## 2023-07-19 NOTE — PROGRESS NOTES
Subjective:    Patient Identification:  Patient ID: Alysa Abernathy is a 72 y.o. female.    Referring Provider:  No ref. provider found     Chief Complaint:  Interstitial Lung Disease      History of Present Illness:    Alysa Abernathy is a 72 y.o. female who presents for a follow-up on rheumatoid arthritis-related interstitial lung disease.  She is been stable in terms of her respiratory symptoms.  She has stopped taking Mucinex at nighttime because she does not feel like she needs it.  She continues to use her flutter valve and says that that helps get sputum up.  She had repeat PFTs today as part of her six-month follow-up.  PFTs show normal spirometry and lung volumes with mildly reduced diffusion capacity which has improved from previous PFT in January of 2023 from 60% to 64%.  She is not on any home oxygen or inhalers has no respiratory complaints.    Review of Systems:  Review of Systems   Constitutional:  Negative for fever, chills, appetite change, night sweats and weakness.   HENT:  Negative for postnasal drip, rhinorrhea, sore throat, trouble swallowing, voice change, congestion and ear pain.    Respiratory:  Positive for sputum production. Negative for hemoptysis, shortness of breath, dyspnea on extertion and use of rescue inhaler.    Cardiovascular:  Negative for chest pain, palpitations and leg swelling.   Genitourinary:  Negative for difficulty urinating and hematuria.   Endocrine:  Negative for polydipsia, polyphagia, cold intolerance, heat intolerance and polyuria.    Musculoskeletal:  Negative for joint swelling and myalgias.   Skin:  Negative for rash.   Gastrointestinal:  Negative for nausea, vomiting, abdominal pain and abdominal distention.   Neurological:  Negative for dizziness, syncope, light-headedness and headaches.   Psychiatric/Behavioral:  Negative for confusion and sleep disturbance. The patient is not nervous/anxious.      Allergies: Review of patient's allergies indicates:  No  "Known Allergies    Medications:      Past Medical History:      Past Medical History:   Diagnosis Date    Arthritis     Cataract     Dry mouth     Thyroid disease        Family History:      Family History   Problem Relation Age of Onset    Hypertension Mother     Hyperlipidemia Father     Cancer Sister         Social History:      Past Surgical History:   Procedure Laterality Date    EYE SURGERY      HYSTERECTOMY      TONSILLECTOMY         Physical Exam:  /79 (BP Location: Right arm, Patient Position: Sitting, BP Method: Medium (Automatic))   Pulse 70   Resp 17   Ht 5' 7" (1.702 m)   Wt 66.7 kg (147 lb)   SpO2 97%   BMI 23.02 kg/m²   Physical Exam   Constitutional: She is oriented to person, place, and time. She appears not cachectic. No distress.   HENT:   Head: Normocephalic.   Right Ear: External ear normal.   Left Ear: External ear normal.   Nose: Nose normal. No mucosal edema. No polyps.   Mouth/Throat: Oropharynx is clear and moist. Normal dentition. No oropharyngeal exudate. Mallampati Score: III.   Neck: No JVD present. No tracheal deviation present. No thyromegaly present.   Cardiovascular: Normal rate, regular rhythm, normal heart sounds and intact distal pulses. Exam reveals no gallop and no friction rub.   No murmur heard.  Pulmonary/Chest: Normal expansion, symmetric chest wall expansion, effort normal and breath sounds normal. No stridor. No respiratory distress. She has no decreased breath sounds. She has no wheezes. She has no rhonchi. She has no rales. Chest wall is not dull to percussion. She exhibits no tenderness. Negative for egophony. Negative for tactile fremitus.   Abdominal: Soft. Bowel sounds are normal. She exhibits no distension and no mass. There is no hepatosplenomegaly. There is no abdominal tenderness. There is no rebound and no guarding. No hernia.   Musculoskeletal:         General: No tenderness, deformity or edema. Normal range of motion.      Cervical back: Normal " range of motion and neck supple.   Lymphadenopathy: No supraclavicular adenopathy is present.     She has no cervical adenopathy.     She has no axillary adenopathy.   Neurological: She is alert and oriented to person, place, and time. She has normal reflexes. She displays normal reflexes. No cranial nerve deficit. She exhibits normal muscle tone.   Skin: Skin is warm and dry. No rash noted. She is not diaphoretic. No cyanosis or erythema. No pallor. Nails show no clubbing.   Psychiatric: She has a normal mood and affect. Her behavior is normal. Judgment and thought content normal.         No results found for: PREFEV1, CBH9YDZATA, PREFVC, FVCPREREF, UKHQUA4ZGF, WIJ8RSHMNCP, ETSF3DEW, ZPIC8EDL, PREDLCO, DLCOSBPRRF, DLCOADJPRE, DLCOCSBRPRRF, POSTFEV1, POSTFVC, HOPFDUR5MIN   X-Ray Chest PA And Lateral  Narrative: EXAM:  XR CHEST PA AND LATERAL    CLINICAL HISTORY:  Interstitial pulmonary disease.    FINDINGS:  The heart is normal in size.  Diffuse increase in reticular nodular interstitial markings throughout both lung fields.  Suspect underlying emphysematous change.  No acute infiltrate or consolidation.  Impression:  Increased interstitial markings throughout both lung fields with probable underlying emphysema.    Finalized on: 1/12/2023 9:12 AM By:  Jared Gregory MD  [facility]# [id number]      2023-01-12 09:14:[id number]    [facility]           Accessory Clinical Data:  Chest x-ray:    CT scan:     PFTs:     6MWT:     TTE:    Lab data:    All radiographic imaging of the chest, PFT tracings/data, and 6MWT data have been independently reviewed and interpreted unless otherwise specified.     Assessment and Plan:      Problem List Items Addressed This Visit          Pulmonary    Interstitial lung disease due to connective tissue disease    Current Assessment & Plan     Stable interstitial lung disease.  Continue with management of rheumatoid arthritis per her rheumatologist.  If respiratory complaints should arise we will need a  high-resolution CT of the chest.  We will continue to follow up clinically with PFTs every 6 months           No orders of the defined types were placed in this encounter.           Follow up in about 6 months (around 1/19/2024) for repeat PFTs.

## 2023-07-19 NOTE — ASSESSMENT & PLAN NOTE
Stable interstitial lung disease.  Continue with management of rheumatoid arthritis per her rheumatologist.  If respiratory complaints should arise we will need a high-resolution CT of the chest.  We will continue to follow up clinically with PFTs every 6 months